# Patient Record
Sex: MALE | Race: WHITE | NOT HISPANIC OR LATINO | Employment: FULL TIME | URBAN - METROPOLITAN AREA
[De-identification: names, ages, dates, MRNs, and addresses within clinical notes are randomized per-mention and may not be internally consistent; named-entity substitution may affect disease eponyms.]

---

## 2018-09-05 ENCOUNTER — APPOINTMENT (OUTPATIENT)
Dept: LAB | Facility: HOSPITAL | Age: 58
End: 2018-09-05

## 2018-09-05 ENCOUNTER — TRANSCRIBE ORDERS (OUTPATIENT)
Dept: ADMINISTRATIVE | Facility: HOSPITAL | Age: 58
End: 2018-09-05

## 2018-09-05 DIAGNOSIS — Z00.8 HEALTH EXAMINATION IN POPULATION SURVEYS: Primary | ICD-10-CM

## 2018-09-05 DIAGNOSIS — Z00.8 HEALTH EXAMINATION IN POPULATION SURVEYS: ICD-10-CM

## 2018-09-05 LAB
CHOLEST SERPL-MCNC: 279 MG/DL (ref 0–200)
EST. AVERAGE GLUCOSE BLD GHB EST-MCNC: 111 MG/DL
HBA1C MFR BLD: 5.5 % (ref 4.2–6.3)
HDLC SERPL-MCNC: 35 MG/DL (ref 40–60)
LDLC SERPL CALC-MCNC: 205 MG/DL (ref 0–100)
NONHDLC SERPL-MCNC: 244 MG/DL
TRIGL SERPL-MCNC: 197 MG/DL (ref 44–166)

## 2018-09-05 PROCEDURE — 80061 LIPID PANEL: CPT

## 2018-09-05 PROCEDURE — 36415 COLL VENOUS BLD VENIPUNCTURE: CPT

## 2018-09-05 PROCEDURE — 83036 HEMOGLOBIN GLYCOSYLATED A1C: CPT

## 2018-10-20 ENCOUNTER — HOSPITAL ENCOUNTER (EMERGENCY)
Facility: HOSPITAL | Age: 58
End: 2018-10-20
Attending: EMERGENCY MEDICINE
Payer: COMMERCIAL

## 2018-10-20 ENCOUNTER — HOSPITAL ENCOUNTER (INPATIENT)
Facility: HOSPITAL | Age: 58
LOS: 3 days | Discharge: HOME | DRG: 520 | End: 2018-10-24
Attending: INTERNAL MEDICINE | Admitting: HOSPITALIST
Payer: COMMERCIAL

## 2018-10-20 ENCOUNTER — HOSPITAL ENCOUNTER (OUTPATIENT)
Dept: MRI IMAGING | Facility: HOSPITAL | Age: 58
Discharge: HOME | End: 2018-10-20
Payer: COMMERCIAL

## 2018-10-20 VITALS
RESPIRATION RATE: 16 BRPM | HEIGHT: 70 IN | BODY MASS INDEX: 24.34 KG/M2 | OXYGEN SATURATION: 98 % | WEIGHT: 170 LBS | TEMPERATURE: 96 F | SYSTOLIC BLOOD PRESSURE: 138 MMHG | HEART RATE: 74 BPM | DIASTOLIC BLOOD PRESSURE: 76 MMHG

## 2018-10-20 DIAGNOSIS — M51.26 LUMBAR HERNIATED DISC: Primary | ICD-10-CM

## 2018-10-20 DIAGNOSIS — M51.26 HERNIATED LUMBAR INTERVERTEBRAL DISC: ICD-10-CM

## 2018-10-20 DIAGNOSIS — M54.16 LUMBAR RADICULOPATHY: Primary | ICD-10-CM

## 2018-10-20 DIAGNOSIS — R29.898 WEAKNESS OF LEFT FOOT: ICD-10-CM

## 2018-10-20 DIAGNOSIS — G95.20 CORD COMPRESSION (HCC): ICD-10-CM

## 2018-10-20 LAB
ALBUMIN SERPL BCP-MCNC: 4.1 G/DL (ref 3.5–5.7)
ALP SERPL-CCNC: 56 U/L (ref 40–150)
ALT SERPL W P-5'-P-CCNC: 14 U/L (ref 7–52)
ANION GAP SERPL CALCULATED.3IONS-SCNC: 9 MMOL/L (ref 4–13)
APTT PPP: 28 SECONDS (ref 24–36)
AST SERPL W P-5'-P-CCNC: 16 U/L (ref 13–39)
BASOPHILS # BLD AUTO: 0.1 THOUSANDS/ΜL (ref 0–0.1)
BASOPHILS NFR BLD AUTO: 1 % (ref 0–1)
BILIRUB SERPL-MCNC: 0.5 MG/DL (ref 0.2–1)
BUN SERPL-MCNC: 16 MG/DL (ref 7–25)
CALCIUM SERPL-MCNC: 8.8 MG/DL (ref 8.6–10.5)
CHLORIDE SERPL-SCNC: 107 MMOL/L (ref 98–107)
CO2 SERPL-SCNC: 24 MMOL/L (ref 21–31)
CREAT SERPL-MCNC: 0.75 MG/DL (ref 0.7–1.3)
EOSINOPHIL # BLD AUTO: 0.2 THOUSAND/ΜL (ref 0–0.61)
EOSINOPHIL NFR BLD AUTO: 3 % (ref 0–6)
ERYTHROCYTE [DISTWIDTH] IN BLOOD BY AUTOMATED COUNT: 13.2 % (ref 11.6–15.1)
GFR SERPL CREATININE-BSD FRML MDRD: 103 ML/MIN/1.73SQ M
GLUCOSE SERPL-MCNC: 100 MG/DL (ref 65–140)
HCT VFR BLD AUTO: 42.2 % (ref 42–52)
HGB BLD-MCNC: 14.5 G/DL (ref 12–17)
INR PPP: 0.98 (ref 0.9–1.5)
LYMPHOCYTES # BLD AUTO: 2.6 THOUSANDS/ΜL (ref 0.6–4.47)
LYMPHOCYTES NFR BLD AUTO: 36 % (ref 14–44)
MCH RBC QN AUTO: 32 PG (ref 26.8–34.3)
MCHC RBC AUTO-ENTMCNC: 34.4 G/DL (ref 31.4–37.4)
MCV RBC AUTO: 93 FL (ref 82–98)
MONOCYTES # BLD AUTO: 0.5 THOUSAND/ΜL (ref 0.17–1.22)
MONOCYTES NFR BLD AUTO: 7 % (ref 4–12)
NEUTROPHILS # BLD AUTO: 3.7 THOUSANDS/ΜL (ref 1.85–7.62)
NEUTS SEG NFR BLD AUTO: 52 % (ref 43–75)
NRBC BLD AUTO-RTO: 0 /100 WBCS
PLATELET # BLD AUTO: 355 THOUSANDS/UL (ref 149–390)
PMV BLD AUTO: 6.4 FL (ref 8.9–12.7)
POTASSIUM SERPL-SCNC: 4 MMOL/L (ref 3.5–5.5)
PROT SERPL-MCNC: 6.9 G/DL (ref 6.4–8.9)
PROTHROMBIN TIME: 11.3 SECONDS (ref 10.1–12.9)
RBC # BLD AUTO: 4.54 MILLION/UL (ref 3.88–5.62)
SODIUM SERPL-SCNC: 140 MMOL/L (ref 134–143)
WBC # BLD AUTO: 7.1 THOUSAND/UL (ref 4.31–10.16)

## 2018-10-20 PROCEDURE — 99285 EMERGENCY DEPT VISIT HI MDM: CPT

## 2018-10-20 PROCEDURE — 85025 COMPLETE CBC W/AUTO DIFF WBC: CPT | Performed by: EMERGENCY MEDICINE

## 2018-10-20 PROCEDURE — 72148 MRI LUMBAR SPINE W/O DYE: CPT

## 2018-10-20 PROCEDURE — 80053 COMPREHEN METABOLIC PANEL: CPT | Performed by: EMERGENCY MEDICINE

## 2018-10-20 PROCEDURE — 85730 THROMBOPLASTIN TIME PARTIAL: CPT | Performed by: EMERGENCY MEDICINE

## 2018-10-20 PROCEDURE — 36415 COLL VENOUS BLD VENIPUNCTURE: CPT | Performed by: EMERGENCY MEDICINE

## 2018-10-20 PROCEDURE — 85610 PROTHROMBIN TIME: CPT | Performed by: EMERGENCY MEDICINE

## 2018-10-20 PROCEDURE — 99220 PR INITIAL OBSERVATION CARE/DAY 70 MINUTES: CPT | Performed by: INTERNAL MEDICINE

## 2018-10-20 RX ORDER — ACETAMINOPHEN 325 MG/1
650 TABLET ORAL EVERY 6 HOURS PRN
Status: DISCONTINUED | OUTPATIENT
Start: 2018-10-20 | End: 2018-10-24 | Stop reason: HOSPADM

## 2018-10-20 RX ORDER — OXYCODONE HYDROCHLORIDE 5 MG/1
10 TABLET ORAL EVERY 4 HOURS PRN
Status: DISCONTINUED | OUTPATIENT
Start: 2018-10-20 | End: 2018-10-24 | Stop reason: HOSPADM

## 2018-10-20 RX ORDER — DEXAMETHASONE SODIUM PHOSPHATE 4 MG/ML
4 INJECTION, SOLUTION INTRA-ARTICULAR; INTRALESIONAL; INTRAMUSCULAR; INTRAVENOUS; SOFT TISSUE EVERY 8 HOURS SCHEDULED
Status: DISCONTINUED | OUTPATIENT
Start: 2018-10-20 | End: 2018-10-23

## 2018-10-20 RX ORDER — OXYCODONE HYDROCHLORIDE 5 MG/1
5 TABLET ORAL EVERY 4 HOURS PRN
Status: DISCONTINUED | OUTPATIENT
Start: 2018-10-20 | End: 2018-10-24 | Stop reason: HOSPADM

## 2018-10-20 RX ADMIN — DEXAMETHASONE SODIUM PHOSPHATE 4 MG: 4 INJECTION, SOLUTION INTRAMUSCULAR; INTRAVENOUS at 22:25

## 2018-10-20 NOTE — ED NOTES
PACS return called for patient information  Pt to be picked up by [facility] to [address]   Call report to [address] [place] [address] Rd, [address]  10/20/18 7887

## 2018-10-20 NOTE — ED NOTES
While on pt rounds pt states he is getting tired of waiting  PACS was recalled and stated they have no placement at this time and are waiting for a bed  Notified dr and pt of the same  HOB elevated  Denies pain or discomfort at this time  No numbness or tingling offered at this time to the lower extremities        Rocky Nolan, JOANA  10/20/18 5831

## 2018-10-20 NOTE — ED NOTES
Report handoff given to [facility] next ED nurse at [facility] till transport company arrives        Yamila Toro RN  10/20/18 5491

## 2018-10-20 NOTE — ED PROVIDER NOTES
History  Chief Complaint   Patient presents with    Leg Swelling     left leg pain and is having increase changes in the ROM  complaints of feeling a burning sensation to the leg       61-year-old male with no past medical history who is presenting with 3 days of weakness in his left foot and 7 days of worsening low back pain  Patient states the pain in his back radiates down his left leg it is described as a burning sensation  He has had similar pain in the past related to sciatica  He noticed the left foot weakness which he describes as dragging associated with difficulty ambulating  Denies any bowel or bladder dysfunction, saddle anesthesia, fever, or other focal deficit  Patient denies any IV drug use  Patient has no known cancer history  History provided by:  Patient      None       History reviewed  No pertinent past medical history  Past Surgical History:   Procedure Laterality Date    HERNIA REPAIR      KNEE SURGERY         History reviewed  No pertinent family history  I have reviewed and agree with the history as documented  Social History   Substance Use Topics    Smoking status: Current Every Day Smoker     Packs/day: 0 25    Smokeless tobacco: Never Used    Alcohol use No        Review of Systems   Constitutional: Negative for chills and fever  HENT: Negative for congestion and rhinorrhea  Respiratory: Negative for shortness of breath  Cardiovascular: Negative for chest pain  Gastrointestinal: Negative for constipation, diarrhea, nausea and vomiting  Genitourinary: Negative for decreased urine volume, difficulty urinating, dysuria, frequency and urgency  Musculoskeletal: Positive for back pain  Negative for neck pain and neck stiffness  Skin: Negative for rash  Neurological: Positive for weakness (As described in HPI) and numbness  Negative for dizziness, syncope and headaches         Physical Exam  Physical Exam   Constitutional: He is oriented to person, place, and time  He appears well-developed and well-nourished  No distress  HENT:   Head: Normocephalic and atraumatic  Eyes: Pupils are equal, round, and reactive to light  EOM are normal    Neck: Normal range of motion  Neck supple  No midline cervical spine tenderness   Cardiovascular: Normal rate, regular rhythm and normal heart sounds  Pulmonary/Chest: Effort normal and breath sounds normal    Abdominal: Soft  Bowel sounds are normal  He exhibits no distension  There is no tenderness  There is no guarding  Musculoskeletal: He exhibits no edema or deformity  Neurological: He is alert and oriented to person, place, and time  No cranial nerve deficit  Patient with negative straight leg raise bilaterally  1/5 strength with extension of the left foot  Strength is otherwise 5/5 in all 4 extremities  Sensory deficit at the left great toe and medial dorsum of left foot  No other sensory deficits appreciated       Skin: Skin is warm and dry  No rash noted  Vital Signs  ED Triage Vitals [10/20/18 0845]   Temperature Pulse Respirations Blood Pressure SpO2   (!) 96 3 °F (35 7 °C) 91 18 132/85 98 %      Temp src Heart Rate Source Patient Position - Orthostatic VS BP Location FiO2 (%)   -- -- -- -- --      Pain Score       No Pain           Vitals:    10/20/18 0845   BP: 132/85   Pulse: 91       Visual Acuity      ED Medications  Medications - No data to display    Diagnostic Studies  Results Reviewed     None                 MRI ED order    (Results Pending)              Procedures  Procedures       Phone Contacts  ED Phone Contact    ED Course                               MDM  Number of Diagnoses or Management Options  Cord compression [facility]):   Lumbar herniated disc:   Diagnosis management comments: 53-year-old male presenting with left foot drop for past 3 days as well as back pain  Vital signs are stable  Patient is nontoxic appearing    Physical exam is consistent with left foot drop  MRI ordered to rule out cord compression    10:25 a m  patient transported to MRI at [facility]     12:45 p m  MRI consistent with left paracentral disc protrusion posteriorly to the L5 vertebral body with displacement of the descending left-sided nerve roots  Patient to be transferred to a facility with neurosurgical intervention  Discussed with Dr Jaycee Carvajal who accepts the transfer  2:30 p m  bed unavailable at this time  Transfer center informed us that that is going to be ready pending discharge of another patient  Relayed this information to the patient  The patient understands  5:06 p m  awaiting transportation and bed assignment  Patient informed me of his intention to leave prior to transfer  I informed them that delay in treatment of this condition could result in permanent disability and dysfunction  Patient agrees to stay at this time  7:00 p m  Patient awaiting transfer,  Randy Mejia made aware of the patient  No acute complaints at this time  CritCare Time    Disposition  Final diagnoses:   None     ED Disposition     None      Follow-up Information    None         Patient's Medications    No medications on file     No discharge procedures on file      ED Provider  Electronically Signed by           Xavier New DO  10/20/18 6178

## 2018-10-20 NOTE — ED NOTES
Returned from MRI with [place] transport provided  Stable  VSS  No resp distress noted  [place]  offered   Pt denies pain or discomfort at present      Amador South RN  10/20/18 4033

## 2018-10-20 NOTE — ED NOTES
Call placed to [facility] for transport tentative pickup is for 1030 AM - MRI notified of the same        Lee Dnenis RN  10/20/18 6773

## 2018-10-20 NOTE — ED NOTES
Report called to/care transferred to [facility]  To [address] oncoming nurse to that floor  Ambulance delayed due to a EMS call   Transport pending at this time     Elizabeth Cabello RN  10/20/18 5590

## 2018-10-20 NOTE — EMTALA/ACUTE CARE TRANSFER
[address]  [facility] EMERGENCY DEPARTMENT  [address] [place] [address] [id number] [phone]  Dept: [phone]      BZFKIR TRANSFER CONSENT    NAME Sarah Beth Erwin                                         1960                              MRN [id number]    I have been informed of my rights regarding examination, treatment, and transfer   by Dr Robby Brewer DO    Benefits: Specialized equipment and/or services available at the receiving facility (Include comment)________________________    Risks: Potential for delay in receiving treatment, Increased discomfort during transfer, Possible worsening of condition or death during transfer      Transfer Request   I acknowledge that my medical condition has been evaluated and explained to me by the emergency department physician or other qualified medical person and/or my attending physician who has recommended and offered to me further medical examination and treatment  I understand the Hospital's obligation with respect to the treatment and stabilization of my emergency medical condition  I nevertheless request to be transferred  I release the Hospital, the doctor, and any other persons caring for me from all responsibility or liability for any injury or ill effects that may result from my transfer and agree to accept all responsibility for the consequences of my choice to transfer, rather than receive stabilizing treatment at the Hospital  I understand that because the transfer is my request, my insurance may not provide reimbursement for the services  The Hospital will assist and direct me and my family in how to make arrangements for transfer, but the hospital is not liable for any fees charged by the transport service  In spite of this understanding, I refuse to consent to further medical examination and treatment which has been offered to me, and request transfer to  Dianna Lees Name, [address] : [address] [facility]   I authorize the performance of emergency medical procedures and treatments upon me in both transit and upon arrival at the receiving facility  Additionally, I authorize the release of any and all medical records to the receiving facility and request they be transported with me, if possible  I authorize the performance of emergency medical procedures and treatments upon me in both transit and upon arrival at the receiving facility  Additionally, I authorize the release of any and all medical records to the receiving facility and request they be transported with me, if possible  I understand that the safest mode of transportation during a medical emergency is an ambulance and that the Hospital advocates the use of this mode of transport  Risks of traveling to the receiving facility by car, including absence of medical control, life sustaining equipment, such as oxygen, and medical personnel has been explained to me and I fully understand them  (KIM CORRECT BOX BELOW)  [  ]  I consent to the stated transfer and to be transported by ambulance/helicopter  [  ]  I consent to the stated transfer, but refuse transportation by ambulance and accept full responsibility for my transportation by car  I understand the risks of non-ambulance transfers and I exonerate the Hospital and its staff from any deterioration in my condition that results from this refusal     X___________________________________________    DATE  10/20/18  TIME________  Signature of patient or legally responsible individual signing on patient behalf           RELATIONSHIP TO PATIENT_________________________          Provider Certification    NAME González Mendoza                                        [facility] 1960                              MRN [id number]    A medical screening exam was performed on the above named patient  Based on the examination:    Condition Necessitating Transfer The primary encounter diagnosis was Lumbar herniated disc  A diagnosis of Cord compression [facility]) was also pertinent to this visit  Patient Condition: The patient has been stabilized such that within reasonable medical probability, no material deterioration of the patient condition or the condition of the unborn child(shayy) is likely to result from the transfer    Reason for Transfer: Level of Care needed not available at this facility    Transfer Requirements: [address]   · Space available and qualified personnel available for treatment as acknowledged by    · Agreed to accept transfer and to provide appropriate medical treatment as acknowledged by       Chris Perez  · Appropriate medical records of the examination and treatment of the patient are provided at the time of transfer   [address] _______  · Transfer will be performed by qualified personnel from    and appropriate transfer equipment as required, including the use of necessary and appropriate life support measures  Provider Certification: I have examined the patient and explained the following risks and benefits of being transferred/refusing transfer to the patient/family:  General risk, such as traffic hazards, adverse weather conditions, rough terrain or turbulence, possible failure of equipment (including vehicle or aircraft), or consequences of actions of persons outside the control of the transport personnel      Based on these reasonable risks and benefits to the patient and/or the unborn child(shayy), and based upon the information available at the time of the patients examination, I certify that the medical benefits reasonably to be expected from the provision of appropriate medical treatments at another medical facility outweigh the increasing risks, if any, to the individuals medical condition, and in the case of labor to the unborn child, from effecting the transfer      X____________________________________________ DATE 10/20/18 TIME_______      ORIGINAL - SEND TO MEDICAL RECORDS   COPY - SEND WITH PATIENT DURING TRANSFER

## 2018-10-20 NOTE — ED NOTES
Pt transported to the MRI at [address] [facility] by [facility]  MRI tech phoned and notified of the pt being in transport        Marva Hernandez RN  10/20/18 1037

## 2018-10-20 NOTE — ED NOTES
PAC called by this rn to request update on the transfer  Per PAC bed is now clean and they will begin to arrange transport shortly and give a call back       Virgie Kolb RN  10/20/18 6630

## 2018-10-21 PROCEDURE — 97163 PT EVAL HIGH COMPLEX 45 MIN: CPT

## 2018-10-21 PROCEDURE — G8979 MOBILITY GOAL STATUS: HCPCS

## 2018-10-21 PROCEDURE — 99225 PR SBSQ OBSERVATION CARE/DAY 25 MINUTES: CPT | Performed by: INTERNAL MEDICINE

## 2018-10-21 PROCEDURE — G8978 MOBILITY CURRENT STATUS: HCPCS

## 2018-10-21 PROCEDURE — 99245 OFF/OP CONSLTJ NEW/EST HI 55: CPT | Performed by: PHYSICIAN ASSISTANT

## 2018-10-21 RX ORDER — POLYETHYLENE GLYCOL 3350 17 G/17G
17 POWDER, FOR SOLUTION ORAL DAILY PRN
Status: DISCONTINUED | OUTPATIENT
Start: 2018-10-21 | End: 2018-10-24 | Stop reason: HOSPADM

## 2018-10-21 RX ADMIN — DEXAMETHASONE SODIUM PHOSPHATE 4 MG: 4 INJECTION, SOLUTION INTRAMUSCULAR; INTRAVENOUS at 21:11

## 2018-10-21 RX ADMIN — DEXAMETHASONE SODIUM PHOSPHATE 4 MG: 4 INJECTION, SOLUTION INTRAMUSCULAR; INTRAVENOUS at 13:56

## 2018-10-21 RX ADMIN — DEXAMETHASONE SODIUM PHOSPHATE 4 MG: 4 INJECTION, SOLUTION INTRAMUSCULAR; INTRAVENOUS at 05:17

## 2018-10-21 NOTE — PLAN OF CARE
Nutrition/Hydration-ADULT     Nutrient/Hydration intake appropriate for improving, restoring or maintaining nutritional needs Not Progressing        Potential for Falls     Patient will remain free of falls Not Progressing

## 2018-10-21 NOTE — PLAN OF CARE
Problem: PHYSICAL THERAPY ADULT  Goal: Performs mobility at highest level of function for planned discharge setting  See evaluation for individualized goals  Treatment/Interventions: Functional transfer training, LE strengthening/ROM, Endurance training, Patient/family training, Bed mobility, Gait training, OT, Spoke to nursing          See flowsheet documentation for full assessment, interventions and recommendations  Prognosis: Good  Problem List: Decreased strength, Decreased endurance, Impaired balance, Decreased safety awareness, Pain  Assessment: Pt is 56 y o  male seen for PT evaluation s/p admit to [facility] on 10/20/2018 w/ Lumbar radiculopathy  PT consulted to assess pt's functional mobility and d/c needs  Order placed for PT eval and tx  Comorbidities affecting pt's physical performance at time of assessment include: L foot drop  PTA, pt was ambulates unrestricted distances and all terrain, has 4 DEANDRE and works full time  Personal factors affecting pt at time of IE include: stairs to enter home, unable to perform dynamic tasks in community, unable to perform physical activity, limited insight into impairments and inability to perform IADLs  Please find objective findings from PT assessment regarding body systems outlined above with impairments and limitations including weakness, impaired balance, decreased endurance, pain, decreased activity tolerance and decreased functional mobility tolerance  Pt demonstrated ability to complete bed mobility and transfers without A  Ambulated with moderately unsteady gait with decreased L foot clearance and frequent scissoring  Educated in importance of pacing  Increased pain and "cramping" with increased activity  Educated in safety during stair management  The following objective measures performed on IE also reveal limitations: Barthel Index: 85/100   Pt's clinical presentation is currently unstable/unpredictable seen in pt's presentation of pain, foot drop  Pt to benefit from continued PT tx to address deficits as defined above and maximize level of functional independent mobility and consistency  From PT/mobility standpoint, recommendation at time of d/c would be OP PT pending progress in order to facilitate return to PLOF  Recommendation: Outpatient PT     PT - OK to Discharge: Yes (when medically stable )    See flowsheet documentation for full assessment

## 2018-10-21 NOTE — UTILIZATION REVIEW
Initial Clinical Review  TRANSFERRED FROM [facility]  Admission: Date/Time/Statement: 10/20/18 @ 2023   Orders Placed This Encounter   Procedures    Place in Observation     Standing Status:   Standing     Number of Occurrences:   1     Order Specific Question:   Admitting Physician     Answer:   Roland Batista [[id number]]     Order Specific Question:   Level of Care     Answer:   Med Surg [16]   History of Illness:   TRANSFERRED FROM [facility]   56 y o  male who presents with 3 days history progressive knee worsening left foot weakness and paresthesias  He reports history of chronic pain and lumbar disc disease  Over the past week, has been doing a lot of physical work and felt he might have aggravated his chronic lumbar disease  He began to notice burning sensation especially affecting his left great toe and left foot medially  He was also unable to flex the left foot  He denies any bowel or urinary dysfunction or saddle anesthesia  At the time of my evaluation, he reports no further back pain but continued left foot weakness and paresthesias  He reports no other focal neurologic deficits  He denies any prior medical history  ED Vital Signs:   ED Triage Vitals   Temperature Pulse Respirations Blood Pressure SpO2   10/20/18 2037 10/20/18 2037 10/20/18 2037 10/20/18 2037 10/20/18 2037   98 3 °F (36 8 °C) 67 18 123/82 99 %      Temp Source Heart Rate Source Patient Position - Orthostatic VS BP Location FiO2 (%)   10/20/18 2037 -- 10/20/18 2037 10/20/18 2037 --   Oral  Lying Right arm       Pain Score       10/20/18 2048       No Pain        Wt Readings from Last 1 Encounters:   10/20/18 69 6 kg (153 lb 7 oz)   Vital Signs (abnormal): Abnormal Labs/Diagnostic Test Results:   MRI LUMBAR SPINE=Motion artifact degrades image quality  Transitional vertebral anatomy seen  The transitional vertebral body appears to represent a lumbarized  S1 when correlated with prior abdominal CT August 15, 2010    No evidence of acute fracture or traumatic subluxation of the lumbar  spine  Multilevel degenerative disc disease and facet arthropathy as detailed  level by level above  There is a focal left paracentral disc protrusion  posterior to the L5 vertebral body  It cannot be determined on these  images whether this originates at the L4-5 or L5-S1 level given the motion  artifact  This does result in displacement of the descending left-sided  nerve roots  The motion artifact particularly limits assessment of the  neural foramen  Neural foraminal narrowing is seen bilaterally at L4-5  and L5-S1  Past Medical/Surgical History: Active Ambulatory Problems     Diagnosis Date Noted    No Active Ambulatory Problems     Resolved Ambulatory Problems     Diagnosis Date Noted    No Resolved Ambulatory Problems     Past Medical History:   Diagnosis Date    Arthritis    Admitting Diagnosis: Back pain, acute [M54 9]  Age/Sex: 56 y o  male  Assessment/Plan:   Lumbar radiculopathy   Assessment & Plan     · Patient presenting with onset left lower extremity paresthesias and weakness more on the left foot/great toe  · Has chronic lumbar disc disease and back pain    MRI of the lumbar spine showed "Multilevel degenerative disc disease and facet arthropathy as detailedlevel by level above   There is a focal left paracentral disc protrusion posterior to the L5 vertebral body   It cannot be determined on these images whether this originates at the L4-5 or L5-S1 level given the motion artifact   This does result in displacement of the descending left-sided nerve roots   The motion artifact particularly limits assessment of the neural foramen   Neural foraminal narrowing is seen bilaterally at L4-5 and L5-S1"  · Currently without back pain at the time of my evaluation  · He will be placed on observation, start on IV steroids, pain control  · Obtain neurosurgery evaluation for further definitive recommendations of care  · PT evaluation   Weakness of left foot   Assessment & Plan     · Secondary to above  · Continue IV steroids, PT evaluation  · Await Neurosurgery input   Admission Orders:  MED SURG  CONSULT NEUROSURGERY  Scheduled Meds:   Current Facility-Administered Medications:  acetaminophen 650 mg Oral Q6H PRN Gagan Earl MD   dexamethasone 4 mg Intravenous [facility] Gagan Earl MD   influenza vaccine 0 5 mL Intramuscular Prior to discharge Janeth Montgomery MD   oxyCODONE 10 mg Oral Q4H PRN Gagan Earl MD   oxyCODONE 5 mg Oral Q4H PRN Gagan Earl MD   Continuous Infusions:    PRN Meds:   acetaminophen    influenza vaccine    oxyCODONE    oxyCODONE

## 2018-10-21 NOTE — ASSESSMENT & PLAN NOTE
· Patient presenting with onset left lower extremity paresthesias and weakness more on the left foot/great toe  · Has chronic lumbar disc disease and back pain  · MRI of the lumbar spine showed "Multilevel degenerative disc disease and facet arthropathy as detailedlevel by level above  There is a focal left paracentral disc protrusion posterior to the L5 vertebral body  It cannot be determined on these images whether this originates at the L4-5 or L5-S1 level given the motion artifact  This does result in displacement of the descending left-sided nerve roots  The motion artifact particularly limits assessment of the neural foramen    Neural foraminal narrowing is seen bilaterally at L4-5 and L5-S1"  · Currently no c/o back pain  · Continue IV decadron  · Pending neurosurgery evaluation  · Pending PT evaluation

## 2018-10-21 NOTE — H&P
H&P- Stacey Mclean 1960, 56 y o  male MRN: [id number]    Unit/Bed#: [facility] 923-01 Encounter: [id number]    Primary Care Provider: Luz Collins MD   Date and time admitted to hospital: 10/20/2018  8:23 PM        * Lumbar radiculopathy   Assessment & Plan    · Patient presenting with onset left lower extremity paresthesias and weakness more on the left foot/great toe  · Has chronic lumbar disc disease and back pain  MRI of the lumbar spine showed "Multilevel degenerative disc disease and facet arthropathy as detailedlevel by level above  There is a focal left paracentral disc protrusion posterior to the L5 vertebral body  It cannot be determined on these images whether this originates at the L4-5 or L5-S1 level given the motion artifact  This does result in displacement of the descending left-sided nerve roots  The motion artifact particularly limits assessment of the neural foramen  Neural foraminal narrowing is seen bilaterally at L4-5 and L5-S1"  · Currently without back pain at the time of my evaluation  · He will be placed on observation, start on IV steroids, pain control  · Obtain neurosurgery evaluation for further definitive recommendations of care  · PT evaluation     Weakness of left foot   Assessment & Plan    · Secondary to above  · Continue IV steroids, PT evaluation  · Await Neurosurgery input         VTE Prophylaxis: Low risk, ambulate       Code Status:  Full code    POLST: POLST form is not discussed and not completed at this time  Anticipated Length of Stay:  Patient will be admitted on an Observation basis with an anticipated length of stay of  < 2 midnights  Justification for Hospital Stay:  Lumbar radiculopathy    Chief Complaint:     Left foot weakness    History of Present Illness:  Stacey Mclean is a 56 y o  male who presents with 3 days history progressive knee worsening left foot weakness and paresthesias  He reports history of chronic pain and lumbar disc disease  Over the past week, has been doing a lot of physical work and felt he might have aggravated his chronic lumbar disease  He began to notice burning sensation especially affecting his left great toe and left foot medially  He was also unable to flex the left foot  He denies any bowel or urinary dysfunction or saddle anesthesia  At the time of my evaluation, he reports no further back pain but continued left foot weakness and paresthesias  He reports no other focal neurologic deficits  He denies any prior medical history    Review of Systems   HENT: Negative  Respiratory: Negative  Gastrointestinal: Negative  Genitourinary: Negative  Musculoskeletal: Negative  Skin: Negative  Neurological: Positive for weakness  Psychiatric/Behavioral: Negative  All other systems reviewed and are negative  Past Medical History:   Diagnosis Date    Arthritis        Past Surgical History:   Procedure Laterality Date    HERNIA REPAIR      KNEE SURGERY         Family History:  non-contributory    Home Meds:  None    Allergies: No Known Allergies      Marital Status: /Civil Union     History   Alcohol Use No     History   Smoking Status    Former Smoker    Packs/day: 0 25   Smokeless Tobacco    Never Used     History   Drug Use    Types: Marijuana           Physical Exam:   Vitals:   Blood Pressure: 123/82 (10/20/18 2037)  Pulse: 67 (10/20/18 2037)  Temperature: 98 3 °F (36 8 °C) (10/20/18 2037)  Temp Source: Oral (10/20/18 2037)  Respirations: 18 (10/20/18 2037)  Height: 5' 10" (177 8 cm) (10/20/18 2037)  Weight - Scale: 69 6 kg (153 lb 7 oz) (10/20/18 2037)  SpO2: 99 % (10/20/18 2037)    Physical Exam   Constitutional: He is oriented to person, place, and time  He appears well-developed and well-nourished  No distress  HENT:   Head: Normocephalic and atraumatic  Eyes: Conjunctivae and EOM are normal  Right eye exhibits no discharge  Left eye exhibits no discharge  No scleral icterus  Neck: Normal range of motion  Neck supple  Cardiovascular: Normal rate, regular rhythm and normal heart sounds  No murmur heard  Pulmonary/Chest: Effort normal and breath sounds normal  No respiratory distress  He has no wheezes  Abdominal: Soft  Bowel sounds are normal  He exhibits no distension and no mass  There is no tenderness  Musculoskeletal: Normal range of motion  He exhibits no edema, tenderness or deformity  Neurological: He is alert and oriented to person, place, and time  No cranial nerve deficit  Decreased sensation noted medial surface left foot, weakness with plantar flexion   Skin: Skin is warm and dry  No rash noted  He is not diaphoretic  No erythema  Psychiatric: He has a normal mood and affect  His behavior is normal    Vitals reviewed  Lab Results: I have personally reviewed pertinent reports  Results from last 7 days  Lab Units 10/20/18  1257   WBC Thousand/uL 7 10   HEMOGLOBIN g/dL 14 5   HEMATOCRIT % 42 2   PLATELETS Thousands/uL 355   NEUTROS PCT % 52   LYMPHS PCT % 36   MONOS PCT % 7   EOS PCT % 3       Results from last 7 days  Lab Units 10/20/18  1257   SODIUM mmol/L 140   POTASSIUM mmol/L 4 0   CHLORIDE mmol/L 107   CO2 mmol/L 24   BUN mg/dL 16   CREATININE mg/dL 0 75   CALCIUM mg/dL 8 8   ALK PHOS U/L 56   ALT U/L 14   AST U/L 16       Results from last 7 days  Lab Units 10/20/18  1257   INR  0 98       Imaging: I have personally reviewed pertinent reports  Mri Lumbar Spine Wo Contrast    Result Date: 10/20/2018  Narrative: INDICATION:  Low back pain into LLE, unable to extend left great toe  ORDERING PROVIDER:  Shahab Salazar  TECHNIQUE:  Unenhanced multiplanar, multisequence MR imaging of the lumbar spine  COMPARISON:  Correlation made with abdominal CT August 15, 2010  FINDINGS:  Motion artifact degrades image quality  Transitional lumbar vertebral anatomy is seen    When correlating with prior abdominal CT August 15, 2010 the transitional vertebral body appears to represent a lumbarized S1  Normal lumbar alignment is demonstrated  Vertebral heights are well maintained  Bone marrow signal is within normal limits, and no suspicious osseous lesion is identified  Conus medullaris is unremarkable  Paraspinal soft tissues and visualized portions of the abdomen and pelvis are unremarkable  Diffuse disc desiccation is seen  Mild disc space narrowing seen throughout the lumbar spine  At L1-2 there is mild diffuse annular bulging  No significant central canal narrowing is seen  The neuroforamen are suboptimally assessed given the motion artifact although no high-grade stenosis is seen  At L2-3 there is diffuse disc bulging  There is mild facet arthropathy  There is mild narrowing the ventral aspect of the central canal   The neuroforamen are suboptimally assessed given the motion artifact  At L3-4 there is concentric disc bulge  There is mild facet arthropathy seen  There is minor narrowing the ventral aspect of the central canal  Mild neural foraminal narrowing suspected but suboptimally assessed given motion artifact  At L4-5 there is concentric disc bulge which is slightly eccentric into the left foraminal region  Bilateral facet arthropathy seen, greater on the left  There is a focal left paracentral disc protrusion posterior to the L5 vertebral body  A cannot be determined whether this originates at the L4-5 or L5-S1 level  This does displace the descending left-sided nerve roots  Neural foraminal narrowing seen bilaterally at L4-5, greater on the left  There is at least mild central canal narrowing at L4-5  At L5-S1 there is diffuse disc bulging  Left paracentral disc extrusion seen posterior to the L5 vertebral body  It cannot be determined whether this originates at L4-5 or L5-S1 level given the motion artifact  This does displace the descending left-sided nerve roots    Bilateral neural foraminal narrowing seen at L5-S1, greater on the left        Impression: Motion artifact degrades image quality  Transitional vertebral anatomy seen  The transitional vertebral body appears to represent a lumbarized S1 when correlated with prior abdominal CT August 15, 2010  No evidence of acute fracture or traumatic subluxation of the lumbar spine  Multilevel degenerative disc disease and facet arthropathy as detailed level by level above  There is a focal left paracentral disc protrusion posterior to the L5 vertebral body  It cannot be determined on these images whether this originates at the L4-5 or L5-S1 level given the motion artifact  This does result in displacement of the descending left-sided nerve roots  The motion artifact particularly limits assessment of the neural foramen  Neural foraminal narrowing is seen bilaterally at L4-5 and L5-S1  Signed by Eduardo Augustine MD      ** Please Note: Dragon 360 Dictation voice to text software may have been used in the creation of this document   **

## 2018-10-21 NOTE — CONSULTS
Consultation - Neurosurgery   Polo Jane 56 y o  male MRN: [id number]  Unit/Bed#: [facility] 923-01 Encounter: [id number]      Assessment/Plan     Assessment:  1  Left foot drop  2  Acute on chronic low back pain  3  Multilevel degenerative disc disease and facet arthropathy    Plan:  - Will give trial of epidural steroid injection tomorrow  -okay for PT and OT mobilization out of bed  - if no improvement may need disc surgery later this week    History of Present Illness   Consults    HPI: Polo Jane is a 56y o  year old male with a history of chronic low back pain  He reports back pain over the last few weeks  After doing physical work  About 4 days ago he woke up his back pain was gone and he noticed he was unable to raise his left toes  His foot is dragging he is finding it difficult to ambulate and he has occasional numbness and is her left great toe  Denies any bowel or bladder symptoms  He is ambulating independently in the room  Consults    Review of Systems   Neurological: Positive for weakness (left foot and toes) and numbness  All other systems reviewed and are negative  Historical Information   Past Medical History:   Diagnosis Date    Arthritis      Past Surgical History:   Procedure Laterality Date    HERNIA REPAIR      KNEE SURGERY       History   Alcohol Use No     History   Drug Use    Types: Marijuana     History   Smoking Status    Former Smoker    Packs/day: 0 25   Smokeless Tobacco    Never Used     History reviewed  No pertinent family history  Meds/Allergies   all current active meds have been reviewed  No Known Allergies    Objective     Intake/Output Summary (Last 24 hours) at 10/21/18 1228  Last data filed at 10/21/18 0900   Gross per 24 hour   Intake              710 ml   Output                0 ml   Net              710 ml       Physical Exam   Constitutional: He is oriented to person, place, and time  He appears well-developed and well-nourished     HENT: Head: Normocephalic and atraumatic  Eyes: EOM are normal    Neck: Neck supple  Cardiovascular: Normal rate and regular rhythm  Pulmonary/Chest: Effort normal    Abdominal: Soft  Neurological: He is alert and oriented to person, place, and time  He has normal strength  He has an abnormal Heel to [employer] (Abnormal with  the left side)  Skin: Skin is warm and dry  Psychiatric: He has a normal mood and affect  His speech is normal      Neurologic Exam     Mental Status   Oriented to person, place, and time  Attention: normal  Concentration: normal    Speech: speech is normal   Level of consciousness: alert  Knowledge: good  Cranial Nerves   Cranial nerves II through XII intact  CN III, IV, VI   Extraocular motions are normal      Motor Exam   Muscle bulk: normal  Overall muscle tone: normal  Right arm tone: normal  Left arm tone: normal  Right leg tone: normal  Left leg tone: normal    Strength   Strength 5/5 throughout  Right anterior tibial: 2/5Unable to raise his left toes    Proximal strength intact       Sensory Exam   Left leg light touch: decreased from toes    Gait, Coordination, and Reflexes     Coordination   Heel to shin coordination: abnormal (Abnormal with  the left side)       Vitals:Blood pressure 122/78, pulse 84, temperature 97 7 °F (36 5 °C), temperature source Oral, resp  rate 19, height 5' 10" (1 778 m), weight 69 6 kg (153 lb 7 oz), SpO2 98 %  ,Body mass index is 22 02 kg/m²  Lab Results: I have personally reviewed pertinent results        Imaging Studies: I have personally reviewed pertinent films in PACS      VTE Prophylaxis: Sequential compression device (Venodyne)

## 2018-10-21 NOTE — PROGRESS NOTES
Progress Note - Iam Hope 1960, 56 y o  male MRN: [id number]    Unit/Bed#: [facility] 923-01 Encounter: [id number]    Primary Care Provider: Joan Ramirez MD   Date and time admitted to hospital: 10/20/2018  8:23 PM        * Lumbar radiculopathy   Assessment & Plan    · Patient presenting with onset left lower extremity paresthesias and weakness more on the left foot/great toe  · Has chronic lumbar disc disease and back pain  · MRI of the lumbar spine showed "Multilevel degenerative disc disease and facet arthropathy as detailedlevel by level above  There is a focal left paracentral disc protrusion posterior to the L5 vertebral body  It cannot be determined on these images whether this originates at the L4-5 or L5-S1 level given the motion artifact  This does result in displacement of the descending left-sided nerve roots  The motion artifact particularly limits assessment of the neural foramen  Neural foraminal narrowing is seen bilaterally at L4-5 and L5-S1"  · Currently no c/o back pain  · Continue IV decadron  · Pending neurosurgery evaluation  · Pending PT evaluation      Weakness of left foot   Assessment & Plan    · Secondary to above  · Continue IV steroids, PT evaluation  · Await Neurosurgery input       VTE Pharmacologic Prophylaxis:   Pharmacologic: low risk   Mechanical VTE Prophylaxis in Place: Yes    Patient Centered Rounds: I have performed bedside rounds with nursing staff today  Discussions with Specialists or Other Care Team Provider: appreciate neurosurgery input  Education and Discussions with Family / Patient: patient  Time Spent for Care: 30 minutes  More than 50% of total time spent on counseling and coordination of care as described above  Current Length of Stay: 1 day(s)    Current Patient Status: Observation   Certification Statement: leave OBS per UR  Awaiting neurosurgery input     Discharge Plan: pending neurosurgery plan  Also awaiting PT evaluation      Code Status: Level 1 - Full Code      Subjective:   Patient reports doing okay  No back pain but still with L great toe weakness/paresthesias  He denies bowel/bladder incontinence  Objective:     Vitals:   Temp (24hrs), Av 2 °F (36 2 °C), Min:96 °F (35 6 °C), Max:98 3 °F (36 8 °C)    Temp:  [96 °F (35 6 °C)-98 3 °F (36 8 °C)] 97 7 °F (36 5 °C)  HR:  [63-88] 84  Resp:  [16-19] 19  BP: (106-138)/(62-82) 122/78  SpO2:  [97 %-99 %] 98 %  Body mass index is 22 02 kg/m²  Input and Output Summary (last 24 hours): Intake/Output Summary (Last 24 hours) at 10/21/18 1025  Last data filed at 10/21/18 0900   Gross per 24 hour   Intake              710 ml   Output                0 ml   Net              710 ml       Physical Exam:     Physical Exam   Constitutional: He is oriented to person, place, and time  No distress  Cardiovascular: Normal rate and regular rhythm  Pulmonary/Chest: Effort normal and breath sounds normal  No respiratory distress  He has no wheezes  Abdominal: Soft  Bowel sounds are normal  He exhibits no distension  There is no tenderness  Musculoskeletal: He exhibits no edema or tenderness  Neurological: He is alert and oriented to person, place, and time  Dec strength L great toe flexion/extension  Skin: Skin is warm and dry  He is not diaphoretic  Psychiatric: He has a normal mood and affect  Nursing note and vitals reviewed        Additional Data:     Labs:      Results from last 7 days  Lab Units 10/20/18  1257   WBC Thousand/uL 7 10   HEMOGLOBIN g/dL 14 5   HEMATOCRIT % 42 2   PLATELETS Thousands/uL 355   NEUTROS PCT % 52   LYMPHS PCT % 36   MONOS PCT % 7   EOS PCT % 3       Results from last 7 days  Lab Units 10/20/18  1257   SODIUM mmol/L 140   POTASSIUM mmol/L 4 0   CHLORIDE mmol/L 107   CO2 mmol/L 24   BUN mg/dL 16   CREATININE mg/dL 0 75   CALCIUM mg/dL 8 8   ALK PHOS U/L 56   ALT U/L 14   AST U/L 16       Results from last 7 days  Lab Units 10/20/18  1257   INR  0 98 * I Have Reviewed All Lab Data Listed Above  * Additional Pertinent Lab Tests Reviewed: All Labs Within Last 24 Hours Reviewed    Imaging:    Imaging Reports Reviewed Today Include: all  Imaging Personally Reviewed by Myself Includes:  none    Recent Cultures (last 7 days):           Last 24 Hours Medication List:     Current Facility-Administered Medications:  acetaminophen 650 mg Oral Q6H PRN Shannan Alarcon MD   dexamethasone 4 mg Intravenous Q8H Sandor Frazier MD   influenza vaccine 0 5 mL Intramuscular Prior to discharge Willem Tanner MD   oxyCODONE 10 mg Oral Q4H PRN Shannan Alarcon MD   oxyCODONE 5 mg Oral Q4H PRN Shannan Alarcon MD        Today, Patient Was Seen By: Lucina Soria PA-C    ** Please Note: Dictation voice to text software may have been used in the creation of this document   **

## 2018-10-21 NOTE — PHYSICAL THERAPY NOTE
Physical Therapy Evaluation     Patient's Name: Peter More    Admitting Diagnosis  Back pain, acute [M54 9]    Problem List  Patient Active Problem List   Diagnosis    Lumbar radiculopathy    Weakness of left foot       Past Medical History  Past Medical History:   Diagnosis Date    Arthritis        Past Surgical History  Past Surgical History:   Procedure Laterality Date    HERNIA REPAIR      KNEE SURGERY        10/21/18 1100   Note Type   Note type Eval/Treat   Pain Assessment   Pain Assessment 0-10   Pain Score No Pain   Home Living   Type of [address] One level   Additional Comments Pt reports he was helping his mother who has cancer and lives with him   Prior Function   Level of [place] Independent with ADLs and functional mobility   Lives With Spouse  (mother)   Receives Help From Family   ADL Assistance Independent   IADLs Independent   Vocational Full time employment  (construction)   Restrictions/Precautions   [place] Bearing Precautions Per Order No   Other Precautions Pain; Fall Risk;Multiple lines; Impulsive   General   Family/Caregiver Present No   Cognition   Orientation Level Oriented X4   RLE Assessment   RLE Assessment WNL   LLE Assessment   LLE Assessment (grossly 5/5 except ankle DF 2/5)   Coordination   Movements are Fluid and Coordinated 0   Coordination and Movement Description mild ataxia, scissoring   Bed Mobility   Supine to Sit 7  Independent   Sit to Supine 7  Independent   Transfers   Sit to Stand 5  Supervision   Stand to Sit 5  Supervision   Ambulation/Elevation   Gait pattern Excessively slow;Decreased foot clearance;Shuffling;Narrow PEG;Scissoring   Gait Assistance 4  Minimal assist   Additional items Assist x 1   Assistive Device None   Distance 120+120   Stair Management Assistance 4  Minimal assist   Additional items Assist x 1   Stair Management Technique One rail R;Nonreciprocal   Number of Stairs 5  (training steps)   Balance   Static Sitting Good Dynamic Sitting Fair +   Static Standing Fair -   Dynamic Standing Fair -   Ambulatory Poor +   Endurance Deficit   Endurance Deficit Yes   Endurance Deficit Description limited by anterior tib pain progressively worsening with movement   Activity Tolerance   Activity Tolerance Patient limited by pain   Medical Staff Made Aware yes, Nevin Lyles from [facility] gave clearance to work with pt   Nurse Made Aware yes, nsg gave clearance to work with pt   Assessment   Prognosis Good   Problem List Decreased strength;Decreased endurance; Impaired balance;Decreased safety awareness;Pain   Assessment Pt is 56 y o  male seen for PT evaluation s/p admit to [facility] on 10/20/2018 w/ Lumbar radiculopathy  PT consulted to assess pt's functional mobility and d/c needs  Order placed for PT eval and tx  Comorbidities affecting pt's physical performance at time of assessment include: L foot drop  PTA, pt was ambulates unrestricted distances and all terrain, has 4 DEANDRE and works full time  Personal factors affecting pt at time of IE include: stairs to enter home, unable to perform dynamic tasks in community, unable to perform physical activity, limited insight into impairments and inability to perform IADLs  Please find objective findings from PT assessment regarding body systems outlined above with impairments and limitations including weakness, impaired balance, decreased endurance, pain, decreased activity tolerance and decreased functional mobility tolerance  Pt demonstrated ability to complete bed mobility and transfers without A  Ambulated with moderately unsteady gait with decreased L foot clearance and frequent scissoring  Educated in importance of pacing  Increased pain and "cramping" with increased activity  Educated in safety during stair management  The following objective measures performed on IE also reveal limitations: Barthel Index: 85/100   Pt's clinical presentation is currently unstable/unpredictable seen in pt's presentation of pain, foot drop  Pt to benefit from continued PT tx to address deficits as defined above and maximize level of functional independent mobility and consistency  From PT/mobility standpoint, recommendation at time of d/c would be OP PT pending progress in order to facilitate return to PLOF  Goals   Patient Goals To get my strength back   STG Expiration Date 11/02/18   Short Term Goal #1 1  Complete bed mobility and transfers I to decrease need for caregiver in home  2  Ambulate 300' I to complete household and community mobility without A  3  Improve dynamic balance to good to decrease need for UE support during ambulation  4  Be educated & demonstate 12 steps to be able to enter home without A  Plan   Treatment/Interventions Functional transfer training;LE strengthening/ROM; Endurance training;Patient/family training;Bed mobility;Gait training;OT;Spoke to nursing   PT Frequency (3-5x/wk)   Recommendation   Recommendation Outpatient PT   PT - OK to Discharge Yes  (when medically stable )   Barthel Index   Feeding 10   Bathing 0   Grooming Score 5   Dressing Score 10   Bladder Score 10   Bowels Score 10   Toilet Use Score 10   Transfers (Bed/Chair) Score 15   Mobility (Level Surface) Score 10   Stairs Score 5   Barthel Index Score 85           Nagi Orozco, PT

## 2018-10-21 NOTE — ASSESSMENT & PLAN NOTE
· Patient presenting with onset left lower extremity paresthesias and weakness more on the left foot/great toe  · Has chronic lumbar disc disease and back pain  MRI of the lumbar spine showed "Multilevel degenerative disc disease and facet arthropathy as detailedlevel by level above  There is a focal left paracentral disc protrusion posterior to the L5 vertebral body  It cannot be determined on these images whether this originates at the L4-5 or L5-S1 level given the motion artifact  This does result in displacement of the descending left-sided nerve roots  The motion artifact particularly limits assessment of the neural foramen    Neural foraminal narrowing is seen bilaterally at L4-5 and L5-S1"  · Currently without back pain at the time of my evaluation  · He will be placed on observation, start on IV steroids, pain control  · Obtain neurosurgery evaluation for further definitive recommendations of care  · PT evaluation

## 2018-10-22 ENCOUNTER — ANESTHESIA EVENT (INPATIENT)
Dept: PERIOP | Facility: HOSPITAL | Age: 58
DRG: 520 | End: 2018-10-22
Payer: COMMERCIAL

## 2018-10-22 PROBLEM — M51.26 HERNIATED LUMBAR INTERVERTEBRAL DISC: Status: ACTIVE | Noted: 2018-10-20

## 2018-10-22 LAB
ABO GROUP BLD: NORMAL
BILIRUB UR QL STRIP: NEGATIVE
BLD GP AB SCN SERPL QL: NEGATIVE
CLARITY UR: NORMAL
COLOR UR: YELLOW
GLUCOSE UR STRIP-MCNC: NEGATIVE MG/DL
HCV AB SER QL: NORMAL
HGB UR QL STRIP.AUTO: NEGATIVE
KETONES UR STRIP-MCNC: NEGATIVE MG/DL
LEUKOCYTE ESTERASE UR QL STRIP: NEGATIVE
NITRITE UR QL STRIP: NEGATIVE
PH UR STRIP.AUTO: 6.5 [PH] (ref 4.5–8)
PROT UR STRIP-MCNC: NEGATIVE MG/DL
RH BLD: POSITIVE
SP GR UR STRIP.AUTO: 1.02 (ref 1–1.03)
SPECIMEN EXPIRATION DATE: NORMAL
UROBILINOGEN UR QL STRIP.AUTO: 1 E.U./DL

## 2018-10-22 PROCEDURE — 93005 ELECTROCARDIOGRAM TRACING: CPT

## 2018-10-22 PROCEDURE — 86901 BLOOD TYPING SEROLOGIC RH(D): CPT | Performed by: PHYSICIAN ASSISTANT

## 2018-10-22 PROCEDURE — 99232 SBSQ HOSP IP/OBS MODERATE 35: CPT | Performed by: NEUROLOGICAL SURGERY

## 2018-10-22 PROCEDURE — 86803 HEPATITIS C AB TEST: CPT | Performed by: INTERNAL MEDICINE

## 2018-10-22 PROCEDURE — 81003 URINALYSIS AUTO W/O SCOPE: CPT | Performed by: PHYSICIAN ASSISTANT

## 2018-10-22 PROCEDURE — 86850 RBC ANTIBODY SCREEN: CPT | Performed by: PHYSICIAN ASSISTANT

## 2018-10-22 PROCEDURE — 99232 SBSQ HOSP IP/OBS MODERATE 35: CPT | Performed by: INTERNAL MEDICINE

## 2018-10-22 PROCEDURE — 86900 BLOOD TYPING SEROLOGIC ABO: CPT | Performed by: PHYSICIAN ASSISTANT

## 2018-10-22 RX ADMIN — DEXAMETHASONE SODIUM PHOSPHATE 4 MG: 4 INJECTION, SOLUTION INTRAMUSCULAR; INTRAVENOUS at 05:47

## 2018-10-22 RX ADMIN — DEXAMETHASONE SODIUM PHOSPHATE 4 MG: 4 INJECTION, SOLUTION INTRAMUSCULAR; INTRAVENOUS at 23:09

## 2018-10-22 RX ADMIN — DEXAMETHASONE SODIUM PHOSPHATE 4 MG: 4 INJECTION, SOLUTION INTRAMUSCULAR; INTRAVENOUS at 13:14

## 2018-10-22 NOTE — PHYSICIAN ADVISOR
Current patient class: Observation  The patient is currently on Hospital Day: 3 at [address] [place] [address]        After review of the relevant documentation, labs, vital signs and test results, the patient is appropriate for INPATIENT ADMISSION  Rationale is as follows: The patient is a 62-year-old male who was hospitalized beginning October 20, 2018 under observation status after presenting with left lower extremity paresthesias and weakness in the distal extremity  The MRI is as follows:    "Impression: Motion artifact degrades image quality  Transitional vertebral anatomy seen  The transitional vertebral body appears to represent a lumbarized S1 when correlated with prior abdominal CT August 15, 2010  No evidence of acute fracture or traumatic subluxation of the lumbar spine  Multilevel degenerative disc disease and facet arthropathy as detailed level by level above  There is a focal left paracentral disc protrusion posterior to the L5 vertebral body  It cannot be determined on these images whether this originates at the L4-5 or L5-S1 level given the motion artifact  This does result in displacement of the descending left-sided nerve roots  The motion artifact particularly limits assessment of the neural foramen  Neural foraminal narrowing is seen bilaterally at L4-5 and L5-S1 "    The patient was seen by Neurosurgery on October 21st   The diagnosis was acute on chronic low back pain with left foot drop and multilevel degenerative disc disease and facet arthropathy and radiculopathy  The patient has remained on dexamethasone 4 mg intravenous every 8 hours since presentation  This medication will continue  Progress notes from today October 22nd indicate that the patient will likely undergo surgery tomorrow  "Patient is not medically clear to leave, needs neurosurgery tomorrow"    The patient did not respond significantly during the observation period to allow for discharge home   At this time I recommend change in status to inpatient level care      The patients vitals on arrival were ED Triage Vitals   Temperature Pulse Respirations Blood Pressure SpO2   10/20/18 2037 10/20/18 2037 10/20/18 2037 10/20/18 2037 10/20/18 2037   98 3 °F (36 8 °C) 67 18 123/82 99 %      Temp Source Heart Rate Source Patient Position - Orthostatic VS BP Location FiO2 (%)   10/20/18 2037 10/22/18 0009 10/20/18 2037 10/20/18 2037 --   Oral Monitor Lying Right arm       Pain Score       10/20/18 2048       No Pain           Past Medical History:   Diagnosis Date    Arthritis      Past Surgical History:   Procedure Laterality Date    HERNIA REPAIR      KNEE SURGERY         The patient was admitted to the hospital at 2023 on 10/20/18 for the following diagnosis:  Back pain, acute [M54 9]    Consults have been placed to:   IP CONSULT TO NEUROSURGERY    Vitals:    10/21/18 1500 10/22/18 0009 10/22/18 0809 10/22/18 0904   BP: 138/73 136/59  126/91   BP Location: Right arm Right arm  Right arm   Pulse: 90 94 71    Resp: 18 18 18    Temp: 98 7 °F (37 1 °C) 98 2 °F (36 8 °C) (!) 97 4 °F (36 3 °C)    TempSrc: Oral Oral Oral    SpO2: 97% 98%     Weight:       Height:           Most recent labs:    Recent Labs      10/20/18   1257   WBC  7 10   HGB  14 5   HCT  42 2   PLT  355   K  4 0   NA  140   CALCIUM  8 8   BUN  16   CREATININE  0 75   INR  0 98   AST  16   ALT  14   ALKPHOS  56       Surgical procedures (if appropriate):  Procedure(s):  MIS/MetRx left L5-S1 hemiLAMINECTOMY, medial facetectmoy, & microdiscectomy

## 2018-10-22 NOTE — ANESTHESIA PREPROCEDURE EVALUATION
Review of Systems/Medical History  Patient summary reviewed  Chart reviewed  No history of anesthetic complications     Cardiovascular  EKG reviewed, Negative cardio ROS   Comment: EKG-SR,  Pulmonary  Smoker (Prev 1ppd, Quit 12/2017) ex-smoker  ,        GI/Hepatic      Comment: Prev hx ETOH abuse--quit 10 years ago     Negative  ROS        Endo/Other  Negative endo/other ROS      GYN  Negative gynecology ROS          Hematology      Comment: H/H 14/43 Musculoskeletal    Comment: Lumbar radiculopathy/Lumbar HNP--left foot weakness/paresthesia      Neurology  Negative neurology ROS      Psychology   Negative psychology ROS              Physical Exam    Airway    Mallampati score: II  TM Distance: >3 FB       Dental   No notable dental hx     Cardiovascular  Comment: Negative ROS, Rhythm: regular,     Pulmonary  Breath sounds clear to auscultation,     Other Findings        Anesthesia Plan  ASA Score- 2     Anesthesia Type- general with ASA Monitors  Additional Monitors:   Airway Plan: ETT  Comment: T&S  Plan Factors-    Induction- intravenous  Postoperative Plan- Plan for postoperative opioid use  Planned trial extubation    Informed Consent- Anesthetic plan and risks discussed with patient  I personally reviewed this patient with the CRNA  Discussed and agreed on the Anesthesia Plan with the CRNA Gerhardt Sep

## 2018-10-22 NOTE — PROGRESS NOTES
Progress Note - Neurosurgery   Ethel Willoughby 56 y o  male MRN: [id number]  [facility] Encounter: [id number]    Assessment:  1  Left foot drop  2  Acute on chronic back pain  3  Multilevel degenerative disc disease and facet arthropathy    Plan:  · Exam:  Alert and oriented x 3, MARTINEZ with significant weakness in left foot with dorsiflexion, reflexes 2+ and symmetrical, no vivar or clonus noted  · Imaging reviewed personally and by attending  Results are as follows:  · MRI lumbar spine wo contrast (10/20/2018): Multilevel degenerative disc disease and facet arthropathy  Focal left paracentral disc protrusion posterior to the L5 vertebral body, resulting in displacement of left sided nerve root  Motion artifact present  · Medical management per primary team  · Mobilize as tolerated with assistance  · DVT PPX:  SCDs (not worn during exam)  · Hold pharm ppx, surgical intervention scheduled for tomorrow  · Neurosurgery will continue to follow  Patient is scheduled for an L5-S1 hemilaminectomy and microdiscectomy with Dr Drew Earing tomorrow  He will be NPO after midnight  Pre-op orders placed and nose to toes protocol ordered  Please call with questions or concerns  Subjective/Objective   Chief Complaint: "I have no pain " / follow up left foot drop    Subjective: Patient denies pain at this time  He states his left foot continues to be weak with dorsiflexion  He has numbness on the dorsal aspect of the left foot  No new numbness / tingling, weakness, or back pain noted  He denies bowel / bladder issues, N/V, CP, SOB, headaches  Objective: Laying in bed, NAD  I/O       10/20 0701 - 10/21 0700 10/21 0701 - 10/22 0700 10/22 0701 - 10/23 0700    P  O  350 1800 400    Total Intake(mL/kg) 350 (5) 1800 (25 9) 400 (5 7)    Net +350 +1800 +400           Unmeasured Urine Occurrence 2 x 3 x 1 x          Invasive Devices     Peripheral Intravenous Line            Peripheral IV 10/21/18 Left Forearm 1 day              Physical Exam:  Vitals: Blood pressure 126/91, pulse 71, temperature (!) 97 4 °F (36 3 °C), temperature source Oral, resp  rate 18, height 5' 10" (1 778 m), weight 69 6 kg (153 lb 7 oz), SpO2 98 %  ,Body mass index is 22 02 kg/m²  General appearance: alert, appears stated age, cooperative and no distress  Head: Normocephalic, without obvious abnormality, atraumatic  Eyes: conjugate gaze  Lungs: non labored breathing  Heart: regular heart rate  Back:  No TTP  Neurologic:   Mental status: Alert, oriented x 3, thought content appropriate  Cranial nerves: grossly intact (Cranial nerves II-XII)  Sensory: Decreased LT in left foot, diminished PP on dorsal aspect of left foot, DST intact, JPS 3/3  Motor: moving all extremities with focal weakness in the left foot, strength 5/5 except as noted:  1/5 DF LLE  Reflexes: 2+ and symmetric, no clonus or vivar noted  Coordination:  No drift, finger to nose normal bilaterally    Lab Results:    Results from last 7 days  Lab Units 10/20/18  1257   WBC Thousand/uL 7 10   HEMOGLOBIN g/dL 14 5   HEMATOCRIT % 42 2   PLATELETS Thousands/uL 355   NEUTROS PCT % 52   MONOS PCT % 7       Results from last 7 days  Lab Units 10/20/18  1257   SODIUM mmol/L 140   POTASSIUM mmol/L 4 0   CHLORIDE mmol/L 107   CO2 mmol/L 24   BUN mg/dL 16   CREATININE mg/dL 0 75   CALCIUM mg/dL 8 8   ALK PHOS U/L 56   ALT U/L 14   AST U/L 16       Results from last 7 days  Lab Units 10/20/18  1257   INR  0 98   PTT seconds 28     No results found for: TROPONINT  ABG:No results found for: PHART, CLO4FHT, PO2ART, BWY6OTS, M4VQGUKP, BEART, SOURCE    Imaging Studies: I have personally reviewed pertinent reports  and I have personally reviewed pertinent films in PACS    Mri Lumbar Spine Wo Contrast    Result Date: 10/20/2018  Impression: Motion artifact degrades image quality  Transitional vertebral anatomy seen    The transitional vertebral body appears to represent a lumbarized S1 when correlated with prior abdominal CT August 15, 2010  No evidence of acute fracture or traumatic subluxation of the lumbar spine  Multilevel degenerative disc disease and facet arthropathy as detailed level by level above  There is a focal left paracentral disc protrusion posterior to the L5 vertebral body  It cannot be determined on these images whether this originates at the L4-5 or L5-S1 level given the motion artifact  This does result in displacement of the descending left-sided nerve roots  The motion artifact particularly limits assessment of the neural foramen  Neural foraminal narrowing is seen bilaterally at L4-5 and L5-S1  Signed by Lucille Ford MD    EKG, Pathology, and Other Studies: I have personally reviewed pertinent reports        VTE Pharmacologic Prophylaxis: Reason for no pharmacologic prophylaxis - surgical intervention scheduled for tomorrow    VTE Mechanical Prophylaxis: sequential compression device

## 2018-10-22 NOTE — ASSESSMENT & PLAN NOTE
· Patient presenting with onset left lower extremity paresthesias and weakness more on the left foot/great toe  · Has chronic lumbar disc disease and back pain  · MRI of the lumbar spine showed "Multilevel degenerative disc disease and facet arthropathy as detailedlevel by level above  There is a focal left paracentral disc protrusion posterior to the L5 vertebral body  It cannot be determined on these images whether this originates at the L4-5 or L5-S1 level given the motion artifact  This does result in displacement of the descending left-sided nerve roots  The motion artifact particularly limits assessment of the neural foramen    Neural foraminal narrowing is seen bilaterally at L4-5 and L5-S1"  · Currently no c/o back pain  · Continue IV decadron  · Neurosurgery had originally recommended trial of CINDY yesterday however today Dr Jeffry Moran recommending surgery tomorrow   · PT rec OP PT

## 2018-10-22 NOTE — PROGRESS NOTES
Progress Note - Jose Louis 1960, 56 y o  male MRN: [id number]    Unit/Bed#: [facility] 923-01 Encounter: [id number]    Primary Care Provider: Brandy Balderrama MD   Date and time admitted to hospital: 10/20/2018  8:23 PM    * Lumbar radiculopathy   Assessment & Plan    · Patient presenting with onset left lower extremity paresthesias and weakness more on the left foot/great toe  · Has chronic lumbar disc disease and back pain  · MRI of the lumbar spine showed "Multilevel degenerative disc disease and facet arthropathy as detailedlevel by level above  There is a focal left paracentral disc protrusion posterior to the L5 vertebral body  It cannot be determined on these images whether this originates at the L4-5 or L5-S1 level given the motion artifact  This does result in displacement of the descending left-sided nerve roots  The motion artifact particularly limits assessment of the neural foramen  Neural foraminal narrowing is seen bilaterally at L4-5 and L5-S1"  · Currently no c/o back pain  · Continue IV decadron  · Neurosurgery had originally recommended trial of CINDY yesterday however today Dr Roni Harley recommending surgery tomorrow   · PT rec OP PT       Weakness of left foot   Assessment & Plan    · Secondary to above  · Continue IV steroids, PT evaluation  · For surgery tomorrow       VTE Pharmacologic Prophylaxis:   Pharmacologic: not on a/c likely pending anticoagulation   Mechanical VTE Prophylaxis in Place: Yes    Patient Centered Rounds: I have performed bedside rounds with nursing staff today  Discussions with Specialists or Other Care Team Provider: Dr Juan Manuel Pressley, neurosurgery    Education and Discussions with Family / Patient: patient  Time Spent for Care: 30 minutes  More than 50% of total time spent on counseling and coordination of care as described above      Current Length of Stay: 1 day(s)    Current Patient Status: Observation   Certification Statement: awaiting to hear back from UR about status  Patient is not medically clear to leave, needs neurosurgery tomorrow     Discharge Plan: not medically stable  patient for surgery tomorrow  Code Status: Level 1 - Full Code      Subjective:   Patient reports doing well  He has no change in symptoms of L foot drop/paresthesias  No back pain  No fevers/chills  Objective:     Vitals:   Temp (24hrs), Av 1 °F (36 7 °C), Min:97 4 °F (36 3 °C), Max:98 7 °F (37 1 °C)    Temp:  [97 4 °F (36 3 °C)-98 7 °F (37 1 °C)] 97 4 °F (36 3 °C)  HR:  [71-94] 71  Resp:  [18] 18  BP: (126-138)/(59-91) 126/91  SpO2:  [97 %-98 %] 98 %  Body mass index is 22 02 kg/m²  Input and Output Summary (last 24 hours): Intake/Output Summary (Last 24 hours) at 10/22/18 1037  Last data filed at 10/22/18 0913   Gross per 24 hour   Intake             1840 ml   Output                0 ml   Net             1840 ml       Physical Exam:     Physical Exam   Constitutional: He is oriented to person, place, and time  No distress  Cardiovascular: Normal rate and regular rhythm  Pulmonary/Chest: Effort normal and breath sounds normal  No respiratory distress  He has no wheezes  Abdominal: Soft  Bowel sounds are normal  He exhibits no distension  There is no tenderness  Musculoskeletal: He exhibits no edema  Neurological: He is alert and oriented to person, place, and time  Slight weakness L foot    Skin: Skin is warm and dry  Psychiatric: He has a normal mood and affect  Nursing note and vitals reviewed        Additional Data:     Labs:      Results from last 7 days  Lab Units 10/20/18  1257   WBC Thousand/uL 7 10   HEMOGLOBIN g/dL 14 5   HEMATOCRIT % 42 2   PLATELETS Thousands/uL 355   NEUTROS PCT % 52   LYMPHS PCT % 36   MONOS PCT % 7   EOS PCT % 3       Results from last 7 days  Lab Units 10/20/18  1257   SODIUM mmol/L 140   POTASSIUM mmol/L 4 0   CHLORIDE mmol/L 107   CO2 mmol/L 24   BUN mg/dL 16   CREATININE mg/dL 0 75   CALCIUM mg/dL 8 8   ALK PHOS U/L 56 ALT U/L 14   AST U/L 16       Results from last 7 days  Lab Units 10/20/18  1257   INR  0 98       * I Have Reviewed All Lab Data Listed Above  * Additional Pertinent Lab Tests Reviewed: All Labs Within Last 24 Hours Reviewed    Imaging:    Imaging Reports Reviewed Today Include: MRI Lumbar spine  Imaging Personally Reviewed by Myself Includes:  none    Recent Cultures (last 7 days):           Last 24 Hours Medication List:     Current Facility-Administered Medications:  acetaminophen 650 mg Oral Q6H PRN Pamela Martinez MD   dexamethasone 4 mg Intravenous Q8H Lord Isela MD   influenza vaccine 0 5 mL Intramuscular Prior to discharge Alberto Mann MD   oxyCODONE 10 mg Oral Q4H PRN Pamela Martinez MD   oxyCODONE 5 mg Oral Q4H PRN Pamela Martinez MD   polyethylene glycol 17 g Oral Daily PRN Александр Pickens PA-C        Today, Patient Was Seen By: Александр Pickens PA-C    ** Please Note: Dictation voice to text software may have been used in the creation of this document   **

## 2018-10-22 NOTE — UTILIZATION REVIEW
Continued Stay Review    Date: 10/22/18    OBSERVATION WRITTEN 10/20/18 @ 2111 CONVERTED TO INPATIENT ADMISSION 10/22/18 @ 1227 DUE TO FURTHER DIAGNOSTIC WORKUP REQUIRED FOR LEFT LOWER EXTREMITY PARESTHESIAS AND WEAKNESS IN THE DISTAL EXTREMITY AND NEED FOR SURGICAL INTERVENTION ON 10/23/18, REQUIRING AT LEAST A 2 MIDNIGHT STAY  Vital Signs: /91 (BP Location: Right arm)   Pulse 71   Temp (!) 97 4 °F (36 3 °C) (Oral)   Resp 18   Ht 5' 10" (1 778 m)   Wt 69 6 kg (153 lb 7 oz)   SpO2 98%   BMI 22 02 kg/m²     Medications:   Scheduled Meds:   Current Facility-Administered Medications:  acetaminophen 650 mg Oral Q6H PRN   dexamethasone 4 mg Intravenous Q8H [address]   influenza vaccine 0 5 mL Intramuscular Prior to discharge   oxyCODONE 10 mg Oral Q4H PRN   oxyCODONE 5 mg Oral Q4H PRN   polyethylene glycol 17 g Oral Daily PRN     Continuous Infusions:    PRN Meds:   acetaminophen    influenza vaccine    oxyCODONE    oxyCODONE    polyethylene glycol    Age/Sex: 56 y o  male     Assessment/Plan:   * Lumbar radiculopathy   Assessment & Plan     · Patient presenting with onset left lower extremity paresthesias and weakness more on the left foot/great toe  · Has chronic lumbar disc disease and back pain      · MRI of the lumbar spine showed "Multilevel degenerative disc disease and facet arthropathy as detailedlevel by level above   There is a focal left paracentral disc protrusion posterior to the L5 vertebral body   It cannot be determined on these images whether this originates at the L4-5 or L5-S1 level given the motion artifact   This does result in displacement of the descending left-sided nerve roots   The motion artifact particularly limits assessment of the neural foramen   Neural foraminal narrowing is seen bilaterally at L4-5 and L5-S1"  · Currently no c/o back pain  · Continue IV decadron  · Neurosurgery had originally recommended trial of CINDY yesterday however today Dr Kwasi Palomino recommending surgery tomorrow   · PT rec OP PT        Weakness of left foot   Assessment & Plan     · Secondary to above  · Continue IV steroids, PT evaluation  · For surgery tomorrow         VTE Pharmacologic Prophylaxis:   Pharmacologic: not on a/c likely pending anticoagulation   Mechanical VTE Prophylaxis in Place: Yes     Certification Statement: awaiting to hear back from UR about status  Patient is not medically clear to leave, needs neurosurgery tomorrow      Discharge Plan: not medically stable  patient for surgery tomorrow  PER DR Divya Garcia, PHYSICIAN ADVISOR, NOTE DATED 10/22/18:  Barbadian Mode, DO Physician Signed   Physician Advisor Date of Service: 10/22/2018 12:21 PM      Expand All Collapse All      Current patient class: Observation  The patient is currently on Hospital Day: 3 at [address] [place] [address]       After review of the relevant documentation, labs, vital signs and test results, the patient is appropriate for INPATIENT ADMISSION         Rationale is as follows:     The patient is a 70-year-old male who was hospitalized beginning October 20, 2018 under observation status after presenting with left lower extremity paresthesias and weakness in the distal extremity  The MRI is as follows:     "Impression: Motion artifact degrades image quality  Transitional vertebral anatomy seen  The transitional vertebral body appears to represent a lumbarized S1 when correlated with prior abdominal CT August 15, 2010  No evidence of acute fracture or traumatic subluxation of the lumbar spine  Multilevel degenerative disc disease and facet arthropathy as detailed level by level above  There is a focal left paracentral disc protrusion posterior to the L5 vertebral body  It cannot be determined on these images whether this originates at the L4-5 or L5-S1 level given the motion artifact  This does result in displacement of the descending left-sided nerve roots    The motion artifact particularly limits assessment of the neural foramen  Neural foraminal narrowing is seen bilaterally at L4-5 and L5-S1 "     The patient was seen by Neurosurgery on October 21st   The diagnosis was acute on chronic low back pain with left foot drop and multilevel degenerative disc disease and facet arthropathy and radiculopathy  The patient has remained on dexamethasone 4 mg intravenous every 8 hours since presentation  This medication will continue  Progress notes from today October 22nd indicate that the patient will likely undergo surgery tomorrow  "Patient is not medically clear to leave, needs neurosurgery tomorrow"  The patient did not respond significantly during the observation period to allow for discharge home    At this time I recommend change in status to inpatient level care             The patients vitals on arrival were ED Triage Vitals   Temperature Pulse Respirations Blood Pressure SpO2   10/20/18 2037 10/20/18 2037 10/20/18 2037 10/20/18 2037 10/20/18 2037   98 3 °F (36 8 °C) 67 18 123/82 99 %       Temp Source Heart Rate Source Patient Position - Orthostatic VS BP Location FiO2 (%)   10/20/18 2037 10/22/18 0009 10/20/18 2037 10/20/18 2037 --   Oral Monitor Lying Right arm         Pain Score           10/20/18 2048           No Pain                 Medical History        Past Medical History:   Diagnosis Date    Arthritis           Surgical History         Past Surgical History:   Procedure Laterality Date    HERNIA REPAIR        KNEE SURGERY                The patient was admitted to the hospital at 2023 on 10/20/18 for the following diagnosis:  Back pain, acute [M54 9]     Consults have been placed to:   IP CONSULT TO NEUROSURGERY     Vitals          Vitals:     10/21/18 1500 10/22/18 0009 10/22/18 0809 10/22/18 0904   BP: 138/73 136/59   126/91   BP Location: Right arm Right arm   Right arm   Pulse: 90 94 71     Resp: 18 18 18     Temp: 98 7 °F (37 1 °C) 98 2 °F (36 8 °C) (!) 97 4 °F (36 3 °C)     TempSrc: Oral Oral Oral     SpO2: 97% 98%       Weight:           Height:                    Most recent labs:         Recent Labs      10/20/18   1257   WBC  7 10   HGB  14 5   HCT  42 2   PLT  355   K  4 0   NA  140   CALCIUM  8 8   BUN  16   CREATININE  0 75   INR  0 98   AST  16   ALT  14   ALKPHOS  56         Surgical procedures (if appropriate):  Procedure(s):  MIS/MetRx left L5-S1 hemiLAMINECTOMY, medial facetectmoy, & microdiscectomy               Thank you,  [facility] Review Department  Phone: [phone]; Fax [phone]  ATTENTION: Please call with any questions or concerns to [phone]  and carefully follow the prompts so that you are directed to the right person  Send all requests for admission clinical reviews, approved or denied determinations and any other requests to fax [phone]   All voicemails are confidential

## 2018-10-22 NOTE — SOCIAL WORK
CM met with patient at bedside to explain CM role and discuss d/c plan  Pt resides with his wife Irina Daniels [phone] in a 1-story home with 4 DEANDRE  Pt is independent with ADLs  No DME  No history of HHC or STR  Pt drives and is employed full-time  Preferred Rx:  [facility] in [place]  PCP: Dr Cindy Manning  No history of D/A or MH treatment reported  No POA    CM reviewed d/c planning process including the following: identifying help at home, patient preference for d/c planning needs, Discharge Lounge, [facility] Meds to Bed program, availability of treatment team to discuss questions or concerns patient and/or family may have regarding understanding medications and recognizing signs and symptoms once discharged  CM also encouraged patient to follow up with all recommended appointments after discharge  Patient advised of importance for patient and family to participate in managing patients medical well being

## 2018-10-23 ENCOUNTER — APPOINTMENT (INPATIENT)
Dept: RADIOLOGY | Facility: HOSPITAL | Age: 58
DRG: 520 | End: 2018-10-23
Payer: COMMERCIAL

## 2018-10-23 ENCOUNTER — ANESTHESIA (INPATIENT)
Dept: PERIOP | Facility: HOSPITAL | Age: 58
DRG: 520 | End: 2018-10-23
Payer: COMMERCIAL

## 2018-10-23 LAB
ATRIAL RATE: 70 BPM
GLUCOSE SERPL-MCNC: 169 MG/DL (ref 65–140)
P AXIS: 59 DEGREES
PR INTERVAL: 156 MS
QRS AXIS: 62 DEGREES
QRSD INTERVAL: 84 MS
QT INTERVAL: 386 MS
QTC INTERVAL: 416 MS
T WAVE AXIS: 58 DEGREES
VENTRICULAR RATE: 70 BPM

## 2018-10-23 PROCEDURE — 72020 X-RAY EXAM OF SPINE 1 VIEW: CPT

## 2018-10-23 PROCEDURE — 93010 ELECTROCARDIOGRAM REPORT: CPT | Performed by: INTERNAL MEDICINE

## 2018-10-23 PROCEDURE — 99232 SBSQ HOSP IP/OBS MODERATE 35: CPT | Performed by: INTERNAL MEDICINE

## 2018-10-23 PROCEDURE — 63030 LAMOT DCMPRN NRV RT 1 LMBR: CPT | Performed by: NEUROLOGICAL SURGERY

## 2018-10-23 PROCEDURE — 82948 REAGENT STRIP/BLOOD GLUCOSE: CPT

## 2018-10-23 PROCEDURE — 0SB20ZZ EXCISION OF LUMBAR VERTEBRAL DISC, OPEN APPROACH: ICD-10-PCS | Performed by: NEUROLOGICAL SURGERY

## 2018-10-23 PROCEDURE — 01NB0ZZ RELEASE LUMBAR NERVE, OPEN APPROACH: ICD-10-PCS | Performed by: NEUROLOGICAL SURGERY

## 2018-10-23 RX ORDER — METHOCARBAMOL 500 MG/1
750 TABLET, FILM COATED ORAL 4 TIMES DAILY PRN
Status: DISCONTINUED | OUTPATIENT
Start: 2018-10-23 | End: 2018-10-24 | Stop reason: HOSPADM

## 2018-10-23 RX ORDER — DOCUSATE SODIUM 100 MG/1
100 CAPSULE, LIQUID FILLED ORAL 2 TIMES DAILY
Status: DISCONTINUED | OUTPATIENT
Start: 2018-10-23 | End: 2018-10-24 | Stop reason: HOSPADM

## 2018-10-23 RX ORDER — ROCURONIUM BROMIDE 10 MG/ML
INJECTION, SOLUTION INTRAVENOUS AS NEEDED
Status: DISCONTINUED | OUTPATIENT
Start: 2018-10-23 | End: 2018-10-23 | Stop reason: SURG

## 2018-10-23 RX ORDER — LIDOCAINE HYDROCHLORIDE AND EPINEPHRINE 10; 10 MG/ML; UG/ML
INJECTION, SOLUTION INFILTRATION; PERINEURAL AS NEEDED
Status: DISCONTINUED | OUTPATIENT
Start: 2018-10-23 | End: 2018-10-23 | Stop reason: HOSPADM

## 2018-10-23 RX ORDER — SODIUM CHLORIDE, SODIUM LACTATE, POTASSIUM CHLORIDE, CALCIUM CHLORIDE 600; 310; 30; 20 MG/100ML; MG/100ML; MG/100ML; MG/100ML
50 INJECTION, SOLUTION INTRAVENOUS CONTINUOUS
Status: DISCONTINUED | OUTPATIENT
Start: 2018-10-23 | End: 2018-10-23

## 2018-10-23 RX ORDER — GLYCOPYRROLATE 0.2 MG/ML
INJECTION INTRAMUSCULAR; INTRAVENOUS AS NEEDED
Status: DISCONTINUED | OUTPATIENT
Start: 2018-10-23 | End: 2018-10-23 | Stop reason: SURG

## 2018-10-23 RX ORDER — PROPOFOL 10 MG/ML
INJECTION, EMULSION INTRAVENOUS AS NEEDED
Status: DISCONTINUED | OUTPATIENT
Start: 2018-10-23 | End: 2018-10-23 | Stop reason: SURG

## 2018-10-23 RX ORDER — SODIUM CHLORIDE 9 MG/ML
125 INJECTION, SOLUTION INTRAVENOUS CONTINUOUS
Status: DISCONTINUED | OUTPATIENT
Start: 2018-10-23 | End: 2018-10-23

## 2018-10-23 RX ORDER — MEPERIDINE HYDROCHLORIDE 25 MG/ML
12.5 INJECTION INTRAMUSCULAR; INTRAVENOUS; SUBCUTANEOUS ONCE AS NEEDED
Status: DISCONTINUED | OUTPATIENT
Start: 2018-10-23 | End: 2018-10-23

## 2018-10-23 RX ORDER — CHLORHEXIDINE GLUCONATE 0.12 MG/ML
15 RINSE ORAL ONCE
Status: COMPLETED | OUTPATIENT
Start: 2018-10-23 | End: 2018-10-23

## 2018-10-23 RX ORDER — SODIUM CHLORIDE 9 MG/ML
75 INJECTION, SOLUTION INTRAVENOUS CONTINUOUS
Status: DISCONTINUED | OUTPATIENT
Start: 2018-10-23 | End: 2018-10-24

## 2018-10-23 RX ORDER — BISACODYL 10 MG
10 SUPPOSITORY, RECTAL RECTAL DAILY PRN
Status: DISCONTINUED | OUTPATIENT
Start: 2018-10-23 | End: 2018-10-24 | Stop reason: HOSPADM

## 2018-10-23 RX ORDER — METOCLOPRAMIDE HYDROCHLORIDE 5 MG/ML
10 INJECTION INTRAMUSCULAR; INTRAVENOUS ONCE AS NEEDED
Status: DISCONTINUED | OUTPATIENT
Start: 2018-10-23 | End: 2018-10-23

## 2018-10-23 RX ORDER — ONDANSETRON 2 MG/ML
INJECTION INTRAMUSCULAR; INTRAVENOUS AS NEEDED
Status: DISCONTINUED | OUTPATIENT
Start: 2018-10-23 | End: 2018-10-23 | Stop reason: SURG

## 2018-10-23 RX ORDER — HYDROMORPHONE HCL/PF 1 MG/ML
0.2 SYRINGE (ML) INJECTION
Status: DISCONTINUED | OUTPATIENT
Start: 2018-10-23 | End: 2018-10-23

## 2018-10-23 RX ORDER — LIDOCAINE HYDROCHLORIDE 10 MG/ML
INJECTION, SOLUTION INFILTRATION; PERINEURAL AS NEEDED
Status: DISCONTINUED | OUTPATIENT
Start: 2018-10-23 | End: 2018-10-23 | Stop reason: SURG

## 2018-10-23 RX ORDER — BUPIVACAINE HYDROCHLORIDE AND EPINEPHRINE 5; 5 MG/ML; UG/ML
INJECTION, SOLUTION EPIDURAL; INTRACAUDAL; PERINEURAL AS NEEDED
Status: DISCONTINUED | OUTPATIENT
Start: 2018-10-23 | End: 2018-10-23 | Stop reason: HOSPADM

## 2018-10-23 RX ORDER — FENTANYL CITRATE 50 UG/ML
INJECTION, SOLUTION INTRAMUSCULAR; INTRAVENOUS AS NEEDED
Status: DISCONTINUED | OUTPATIENT
Start: 2018-10-23 | End: 2018-10-23 | Stop reason: SURG

## 2018-10-23 RX ORDER — METHYLPREDNISOLONE ACETATE 40 MG/ML
INJECTION, SUSPENSION INTRA-ARTICULAR; INTRALESIONAL; INTRAMUSCULAR; SOFT TISSUE AS NEEDED
Status: DISCONTINUED | OUTPATIENT
Start: 2018-10-23 | End: 2018-10-23 | Stop reason: HOSPADM

## 2018-10-23 RX ORDER — ONDANSETRON 2 MG/ML
4 INJECTION INTRAMUSCULAR; INTRAVENOUS ONCE AS NEEDED
Status: DISCONTINUED | OUTPATIENT
Start: 2018-10-23 | End: 2018-10-23

## 2018-10-23 RX ORDER — LABETALOL HYDROCHLORIDE 5 MG/ML
INJECTION, SOLUTION INTRAVENOUS AS NEEDED
Status: DISCONTINUED | OUTPATIENT
Start: 2018-10-23 | End: 2018-10-23 | Stop reason: SURG

## 2018-10-23 RX ORDER — HYDROMORPHONE HYDROCHLORIDE 2 MG/ML
INJECTION, SOLUTION INTRAMUSCULAR; INTRAVENOUS; SUBCUTANEOUS AS NEEDED
Status: DISCONTINUED | OUTPATIENT
Start: 2018-10-23 | End: 2018-10-23 | Stop reason: SURG

## 2018-10-23 RX ORDER — MIDAZOLAM HYDROCHLORIDE 1 MG/ML
INJECTION INTRAMUSCULAR; INTRAVENOUS AS NEEDED
Status: DISCONTINUED | OUTPATIENT
Start: 2018-10-23 | End: 2018-10-23 | Stop reason: SURG

## 2018-10-23 RX ORDER — FENTANYL CITRATE/PF 50 MCG/ML
50 SYRINGE (ML) INJECTION
Status: DISCONTINUED | OUTPATIENT
Start: 2018-10-23 | End: 2018-10-23

## 2018-10-23 RX ADMIN — FENTANYL CITRATE 50 MCG: 50 INJECTION, SOLUTION INTRAMUSCULAR; INTRAVENOUS at 07:28

## 2018-10-23 RX ADMIN — MIDAZOLAM 2 MG: 1 INJECTION INTRAMUSCULAR; INTRAVENOUS at 07:19

## 2018-10-23 RX ADMIN — FENTANYL CITRATE 50 MCG: 50 INJECTION, SOLUTION INTRAMUSCULAR; INTRAVENOUS at 07:45

## 2018-10-23 RX ADMIN — NEOSTIGMINE METHYLSULFATE 4 MG: 1 INJECTION, SOLUTION INTRAMUSCULAR; INTRAVENOUS; SUBCUTANEOUS at 09:30

## 2018-10-23 RX ADMIN — HYDROMORPHONE HYDROCHLORIDE 0.25 MG: 2 INJECTION, SOLUTION INTRAMUSCULAR; INTRAVENOUS; SUBCUTANEOUS at 07:50

## 2018-10-23 RX ADMIN — PROPOFOL 200 MG: 10 INJECTION, EMULSION INTRAVENOUS at 07:28

## 2018-10-23 RX ADMIN — DOCUSATE SODIUM 100 MG: 100 CAPSULE, LIQUID FILLED ORAL at 18:09

## 2018-10-23 RX ADMIN — DOCUSATE SODIUM 100 MG: 100 CAPSULE, LIQUID FILLED ORAL at 12:36

## 2018-10-23 RX ADMIN — GLYCOPYRROLATE 0.8 MG: 0.2 INJECTION, SOLUTION INTRAMUSCULAR; INTRAVENOUS at 09:30

## 2018-10-23 RX ADMIN — SODIUM CHLORIDE 75 ML/HR: 0.9 INJECTION, SOLUTION INTRAVENOUS at 23:41

## 2018-10-23 RX ADMIN — HYDROMORPHONE HYDROCHLORIDE 0.25 MG: 2 INJECTION, SOLUTION INTRAMUSCULAR; INTRAVENOUS; SUBCUTANEOUS at 08:05

## 2018-10-23 RX ADMIN — ROCURONIUM BROMIDE 50 MG: 10 INJECTION INTRAVENOUS at 07:28

## 2018-10-23 RX ADMIN — LABETALOL HYDROCHLORIDE 10 MG: 5 INJECTION, SOLUTION INTRAVENOUS at 08:50

## 2018-10-23 RX ADMIN — LIDOCAINE HYDROCHLORIDE 50 MG: 10 INJECTION, SOLUTION INFILTRATION; PERINEURAL at 07:28

## 2018-10-23 RX ADMIN — LABETALOL HYDROCHLORIDE 5 MG: 5 INJECTION, SOLUTION INTRAVENOUS at 08:10

## 2018-10-23 RX ADMIN — ONDANSETRON 4 MG: 2 INJECTION INTRAMUSCULAR; INTRAVENOUS at 09:25

## 2018-10-23 RX ADMIN — CEFAZOLIN SODIUM 2000 MG: 2 SOLUTION INTRAVENOUS at 07:23

## 2018-10-23 RX ADMIN — HYDROMORPHONE HYDROCHLORIDE 0.5 MG: 2 INJECTION, SOLUTION INTRAMUSCULAR; INTRAVENOUS; SUBCUTANEOUS at 08:15

## 2018-10-23 RX ADMIN — SODIUM CHLORIDE 75 ML/HR: 0.9 INJECTION, SOLUTION INTRAVENOUS at 10:23

## 2018-10-23 RX ADMIN — DEXAMETHASONE SODIUM PHOSPHATE 4 MG: 4 INJECTION, SOLUTION INTRAMUSCULAR; INTRAVENOUS at 05:01

## 2018-10-23 RX ADMIN — FENTANYL CITRATE 50 MCG: 50 INJECTION, SOLUTION INTRAMUSCULAR; INTRAVENOUS at 09:10

## 2018-10-23 RX ADMIN — ROCURONIUM BROMIDE 20 MG: 10 INJECTION INTRAVENOUS at 08:15

## 2018-10-23 RX ADMIN — ROCURONIUM BROMIDE 20 MG: 10 INJECTION INTRAVENOUS at 08:48

## 2018-10-23 RX ADMIN — FENTANYL CITRATE 50 MCG: 50 INJECTION, SOLUTION INTRAMUSCULAR; INTRAVENOUS at 09:35

## 2018-10-23 RX ADMIN — CHLORHEXIDINE GLUCONATE 15 ML: 1.2 RINSE ORAL at 07:09

## 2018-10-23 RX ADMIN — SODIUM CHLORIDE 125 ML/HR: 0.9 INJECTION, SOLUTION INTRAVENOUS at 05:08

## 2018-10-23 RX ADMIN — CEFAZOLIN SODIUM 1000 MG: 1 SOLUTION INTRAVENOUS at 21:06

## 2018-10-23 RX ADMIN — LABETALOL HYDROCHLORIDE 5 MG: 5 INJECTION, SOLUTION INTRAVENOUS at 09:00

## 2018-10-23 NOTE — PLAN OF CARE
Problem: Potential for Falls  Goal: Patient will remain free of falls  INTERVENTIONS:  - Assess patient frequently for physical needs  -  Identify cognitive and physical deficits and behaviors that affect risk of falls  -  [place] fall precautions as indicated by assessment   - Educate patient/family on patient safety including physical limitations  - Instruct patient to call for assistance with activity based on assessment  - Modify environment to reduce risk of injury  - Consider OT/PT consult to assist with strengthening/mobility   Outcome: Progressing      Problem: Nutrition/Hydration-ADULT  Goal: Nutrient/Hydration intake appropriate for improving, restoring or maintaining nutritional needs  Monitor and assess patient's nutrition/hydration status for malnutrition (ex- brittle hair, bruises, dry skin, pale skin and conjunctiva, muscle wasting, smooth red tongue, and disorientation)  Collaborate with interdisciplinary team and initiate plan and interventions as ordered  Monitor patient's weight and dietary intake as ordered or per policy  Utilize nutrition screening tool and intervene per policy  Determine patient's food preferences and provide high-protein, high-caloric foods as appropriate       INTERVENTIONS:  - Monitor oral intake, urinary output, labs, and treatment plans  - Assess nutrition and hydration status and recommend course of action  - Evaluate amount of meals eaten  - Assist patient with eating if necessary   - Allow adequate time for meals  - Recommend/ encourage appropriate diets, oral nutritional supplements, and vitamin/mineral supplements  - Order, calculate, and assess calorie counts as needed  - Recommend, monitor, and adjust tube feedings and TPN/PPN based on assessed needs  - Assess need for intravenous fluids  - Provide specific nutrition/hydration education as appropriate  - Include patient/family/caregiver in decisions related to nutrition   Outcome: Progressing      Problem: DISCHARGE PLANNING - CARE MANAGEMENT  Goal: Discharge to post-acute care or home with appropriate resources  INTERVENTIONS:  - Conduct assessment to determine patient/family and health care team treatment goals, and need for post-acute services based on payer coverage, community resources, and patient preferences, and barriers to discharge  - Address psychosocial, clinical, and financial barriers to discharge as identified in assessment in conjunction with the patient/family and health care team  - Arrange appropriate level of post-acute services according to patient's   needs and preference and payer coverage in collaboration with the physician and health care team  - Communicate with and update the patient/family, physician, and health care team regarding progress on the discharge plan  - Arrange appropriate transportation to post-acute venues   Outcome: Progressing

## 2018-10-23 NOTE — PROGRESS NOTES
Progress Note - Jericho Mari 1960, 56 y o  male MRN: [id number]    Unit/Bed#: [facility] 923-01 Encounter: [id number]    Primary Care Provider: Jose Davis MD   Date and time admitted to hospital: 10/20/2018  8:23 PM    * Lumbar radiculopathy   Assessment & Plan    · Patient presenting with onset left lower extremity paresthesias and weakness more on the left foot/great toe  · Has chronic lumbar disc disease and back pain  · MRI of the lumbar spine showed "Multilevel degenerative disc disease and facet arthropathy as detailedlevel by level above  There is a focal left paracentral disc protrusion posterior to the L5 vertebral body  It cannot be determined on these images whether this originates at the L4-5 or L5-S1 level given the motion artifact  This does result in displacement of the descending left-sided nerve roots  The motion artifact particularly limits assessment of the neural foramen  Neural foraminal narrowing is seen bilaterally at L4-5 and L5-S1"  · Currently no c/o back pain  SP 2 days of IV decadron  · S/P MIS/MetRx left L5-S1 hemilaminectomy, medial facetectomy and microdiscectomy earlier today with neurosurgery   · PT recommending OP PT       Weakness of left foot   Assessment & Plan    · Secondary to above  Has improved already s/p surgery today  · ?tentative dc in AM if okay with neurosurgery        VTE Pharmacologic Prophylaxis:   Pharmacologic: none s /p surgery   Mechanical VTE Prophylaxis in Place: Yes    Patient Centered Rounds: I have performed bedside rounds with nursing staff today  Discussions with Specialists or Other Care Team Provider: appreciate neurosurgery    Education and Discussions with Family / Patient: patient    Time Spent for Care: 30 minutes  More than 50% of total time spent on counseling and coordination of care as described above      Current Length of Stay: 2 day(s)    Current Patient Status: Inpatient   Certification Statement: The patient will continue to require additional inpatient hospital stay due to f/u neurosurgery     Discharge Plan: home tomorrow if okay with neurosurgery and doing well? Code Status: Level 1 - Full Code      Subjective:   Felling well s/p surgery  No pain  Able to move LLE  Objective:     Vitals:   Temp (24hrs), Av 7 °F (36 5 °C), Min:97 2 °F (36 2 °C), Max:98 4 °F (36 9 °C)    Temp:  [97 2 °F (36 2 °C)-98 4 °F (36 9 °C)] 97 6 °F (36 4 °C)  HR:  [52-74] 59  Resp:  [10-19] 14  BP: (110-147)/(68-83) 110/69  SpO2:  [95 %-99 %] 99 %  Body mass index is 22 02 kg/m²  Input and Output Summary (last 24 hours): Intake/Output Summary (Last 24 hours) at 10/23/18 1158  Last data filed at 10/23/18 0940   Gross per 24 hour   Intake             2185 ml   Output               50 ml   Net             2135 ml       Physical Exam:     Physical Exam   Constitutional: He is oriented to person, place, and time  No distress  Cardiovascular: Normal rate and regular rhythm  Pulmonary/Chest: Effort normal and breath sounds normal  No respiratory distress  Abdominal: Soft  Bowel sounds are normal  He exhibits no distension  There is no tenderness  Musculoskeletal: He exhibits no edema  Neurological: He is alert and oriented to person, place, and time  Skin: Skin is warm and dry  Psychiatric: He has a normal mood and affect  Nursing note and vitals reviewed        Additional Data:     Labs:      Results from last 7 days  Lab Units 10/20/18  1257   WBC Thousand/uL 7 10   HEMOGLOBIN g/dL 14 5   HEMATOCRIT % 42 2   PLATELETS Thousands/uL 355   NEUTROS PCT % 52   LYMPHS PCT % 36   MONOS PCT % 7   EOS PCT % 3       Results from last 7 days  Lab Units 10/20/18  1257   SODIUM mmol/L 140   POTASSIUM mmol/L 4 0   CHLORIDE mmol/L 107   CO2 mmol/L 24   BUN mg/dL 16   CREATININE mg/dL 0 75   CALCIUM mg/dL 8 8   ALK PHOS U/L 56   ALT U/L 14   AST U/L 16       Results from last 7 days  Lab Units 10/20/18  1257   INR  0 98       * I Have Reviewed All Lab Data Listed Above  * Additional Pertinent Lab Tests Reviewed: All Labs Within Last 24 Hours Reviewed    Imaging:    Imaging Reports Reviewed Today Include: none  Imaging Personally Reviewed by Myself Includes:  none    Recent Cultures (last 7 days):           Last 24 Hours Medication List:     Current Facility-Administered Medications:  acetaminophen 650 mg Oral Q6H PRN Joe Ren MD    bisacodyl 10 mg Rectal Daily PRN Ella Patrick MD    cefazolin 1,000 mg Intravenous Q8H Ella Patrick MD    dexamethasone 4 mg Intravenous Q8H [address] Joe Ren MD    docusate sodium 100 mg Oral BID Ella Patrick MD    influenza vaccine 0 5 mL Intramuscular Prior to discharge Patricia Morrison MD    methocarbamol 750 mg Oral 4x Daily PRN Ella Patrick MD    oxyCODONE 10 mg Oral Q4H PRN Joe Ren MD    oxyCODONE 5 mg Oral Q4H PRN Joe Ren MD    polyethylene glycol 17 g Oral Daily PRN Juan Parry PA-C    sodium chloride 75 mL/hr Intravenous Continuous Ella Patrick MD Last Rate: 75 mL/hr (10/23/18 1023)        Today, Patient Was Seen By: Juan Parry PA-C    ** Please Note: Dictation voice to text software may have been used in the creation of this document   **

## 2018-10-23 NOTE — PLAN OF CARE
Problem: Potential for Falls  Goal: Patient will remain free of falls  INTERVENTIONS:  - Assess patient frequently for physical needs  -  Identify cognitive and physical deficits and behaviors that affect risk of falls  -  [place] fall precautions as indicated by assessment   - Educate patient/family on patient safety including physical limitations  - Instruct patient to call for assistance with activity based on assessment  - Modify environment to reduce risk of injury  - Consider OT/PT consult to assist with strengthening/mobility   Outcome: Progressing      Problem: Nutrition/Hydration-ADULT  Goal: Nutrient/Hydration intake appropriate for improving, restoring or maintaining nutritional needs  Monitor and assess patient's nutrition/hydration status for malnutrition (ex- brittle hair, bruises, dry skin, pale skin and conjunctiva, muscle wasting, smooth red tongue, and disorientation)  Collaborate with interdisciplinary team and initiate plan and interventions as ordered  Monitor patient's weight and dietary intake as ordered or per policy  Utilize nutrition screening tool and intervene per policy  Determine patient's food preferences and provide high-protein, high-caloric foods as appropriate       INTERVENTIONS:  - Monitor oral intake, urinary output, labs, and treatment plans  - Assess nutrition and hydration status and recommend course of action  - Evaluate amount of meals eaten  - Assist patient with eating if necessary   - Allow adequate time for meals  - Recommend/ encourage appropriate diets, oral nutritional supplements, and vitamin/mineral supplements  - Order, calculate, and assess calorie counts as needed  - Recommend, monitor, and adjust tube feedings and TPN/PPN based on assessed needs  - Assess need for intravenous fluids  - Provide specific nutrition/hydration education as appropriate  - Include patient/family/caregiver in decisions related to nutrition   Outcome: Progressing      Problem: DISCHARGE PLANNING - CARE MANAGEMENT  Goal: Discharge to post-acute care or home with appropriate resources  INTERVENTIONS:  - Conduct assessment to determine patient/family and health care team treatment goals, and need for post-acute services based on payer coverage, community resources, and patient preferences, and barriers to discharge  - Address psychosocial, clinical, and financial barriers to discharge as identified in assessment in conjunction with the patient/family and health care team  - Arrange appropriate level of post-acute services according to patient's   needs and preference and payer coverage in collaboration with the physician and health care team  - Communicate with and update the patient/family, physician, and health care team regarding progress on the discharge plan  - Arrange appropriate transportation to post-acute venues   Outcome: Progressing

## 2018-10-23 NOTE — OP NOTE
Neurosurgery Operative Room Note    Mary Jo Mccabe  10/23/2018    Pre-op Diagnosis:   Lumbar radiculopathy [M54 16]  Weakness of left foot [M21 42]  Herniated lumbar intervertebral disc [M51 26]    Post-op Dignosis:     Post-Op Diagnosis Codes:     * Lumbar radiculopathy [M54 16]     * Weakness of left foot [M21 42]     * Herniated lumbar intervertebral disc [M51 26]    Procedure:  Procedure(s):  MIS/MetRx left L5-S1 hemiLAMINECTOMY, medial facetectmoy, & microdiscectomy    Surgeon: Surgeon(s) and Role:     * Chelsey Jain MD - Primary     Anesthesia: General    Staff:   Circulator: Uriah Figueroa RN  Radiology Technologist: Itzel Cohn Circulator: Caitie Soares RN  Scrub Person: Chong Ordonez  No qualified Resident was available  Estimated Blood Loss: Minimal    Specimens:                * No orders in the log *    Drains:      Findings:  Large extruded disc behind the vertebral body of what was designated L5 on the patient's preop MRI    Complications:  none    INDICATIONS    56-year-old male who has a history of back pain, but nearly week ago began with acute left leg sciatic like pain  This evolved and resolved but at the same time of onset, he noticed he had a left foot drop  This persisted  He presented to the hospital for evaluation, and an MRI scan lumbar spine demonstrated herniated disc behind the body of was designated L5  Patient had some transitional anatomy, and secondly this may well have been L4, but for the purposes of this dictation I will remain consistent with the nomenclature used in the MRI scan of the lumbar spine  The patient had clearly a foot drop on the left side, EHL perhaps 1-2/5, dorsiflexion/tibialis anterior perhaps 2-3/5  He had some numbness and pins and needles in the large toe of the left foot  I met person with the patient discussed various options for management including risks, benefits, alternatives    We discussed expected management versus diskectomy to remove this extruded disc, and hopefully allow his foot drop to improve  He wished to proceed, written consent was obtained, and he was brought to the OR during the same admission for the above take place  DETAILS OF PROCEDURE    The patient was brought to the operating room and placed under general anesthetic  Once all appropriate lines were in position, the patient was carefully turned to the prone position onto gel rolls  Arms were in the up position  Care was taken to ensure that all pressure points were padded and the neck was in a neutral position        The posterior lumbar area was prepped and draped in standard fashion  AP and lateral fluoroscopy were used to identify the L5 level        1 cm to the left of midline was marked with the parasagittal incision approximately 2 cm in length  A surgical timeout was undertaken  Antibiotics were given per protocol  One percent lidocaine with 100,000 of epinephrine was used to infiltrate the soft tissue at the incision site        A skin scalpel was used to incise the skin  Monopolar cautery was used to dissect down and through the muscle fascia  A series of Metrx dilators were placed and a final Metrix retractor was placed with lateral fluoroscopy confirming the L5 level  This was also confirmed by radiology  The operating microscope was then brought in for microdissection       Under microscopic magnification, monopolar cautery was used to remove the soft tissue off the medial facet of L5-S1, and the lamina at the L5 level  A flexible matchstick bur was used to drill the lamina as well as part of the superior L5-S1 facet and expose the underlying dura and superior aspect of the ligamentum flavum  The laminotomy was expanded, and the left L5 pedicle compelled patent  The lateral aspect of the thecal sac was identified, and dissected  The lateral edge was identified and retracted    The L5 nerve root emanated inferior, and was retracted inferior and medial   I dissected underneath the thecal sac with a ball nerve hook, and several large pieces of extruded disc came free and were extracted  Ultimately this allowed the thecal sac to drop into the ventral space, and it became well decompressed  The exiting L5 nerve root was better identified, and a foraminotomy of left L5 was performed to ensure it had sufficient decompression  I also dissected in the axilla of the L5 nerve root, down to the L5-S1 disc space  This was incised and treated with ball nerve hook, Minor curette etc to minimize a slight bulge that was still present  Bipolar cautery and thrombin soaked gelfoam were used for hemostasis  There was no sigh of durotomy  40 mg of DepoMedrol was applied epidurally        The Metrx retractor was removed and any bleeding from soft tissue was cauterized with monopolar and bipolar cautery        2-0 Vicryl suture was used in an interrupted fashion to approximate the fascia, as well as subdermally  Staples were used to approximate the skin edges  0 5% Marcaine with 1/100,000 of epinephrine was used to infiltrate the soft tissue  A Mepilex was applied       The count was correct at the end of the case and there were no complications        The patient was carefully transferred onto the operating gurney and extubated   The patient was transferred to the PACU in cardio vascularly stable condition

## 2018-10-23 NOTE — ANESTHESIA POSTPROCEDURE EVALUATION
Post-Op Assessment Note      CV Status:  Stable    Mental Status:  Alert and awake    Hydration Status:  Euvolemic    PONV Controlled:  Controlled    Airway Patency:  Patent    Post Op Vitals Reviewed: Yes          Staff: CRNA           BP (P) 147/83 (10/23/18 0942)    Temp (!) (P) 97 2 °F (36 2 °C) (10/23/18 0942)    Pulse  66   Resp   22   SpO2   95 RA

## 2018-10-23 NOTE — ASSESSMENT & PLAN NOTE
· Patient presenting with onset left lower extremity paresthesias and weakness more on the left foot/great toe  · Has chronic lumbar disc disease and back pain  · MRI of the lumbar spine showed "Multilevel degenerative disc disease and facet arthropathy as detailedlevel by level above  There is a focal left paracentral disc protrusion posterior to the L5 vertebral body  It cannot be determined on these images whether this originates at the L4-5 or L5-S1 level given the motion artifact  This does result in displacement of the descending left-sided nerve roots  The motion artifact particularly limits assessment of the neural foramen  Neural foraminal narrowing is seen bilaterally at L4-5 and L5-S1"  · Currently no c/o back pain  SP 2 days of IV decadron    · S/P MIS/MetRx left L5-S1 hemilaminectomy, medial facetectomy and microdiscectomy earlier today with neurosurgery   · PT recommending OP PT

## 2018-10-23 NOTE — ASSESSMENT & PLAN NOTE
· Secondary to above  Has improved already s/p surgery today    · ?tentative dc in AM if okay with neurosurgery

## 2018-10-23 NOTE — PHYSICAL THERAPY NOTE
PT SCREEN    PT CONSULT RECEIVED  CHART REVIEW PERFORMED  PATIENT IN OR FOR MIS/MetRx left L5-S1 hemiLAMINECTOMY, medial facetectmoy, & microdiscectomy AT THIS TIME  WILL SCREEN ORDER  PLEASE PLACE POST OPERATIVE CONSULT      Venice Day, PT

## 2018-10-24 VITALS
DIASTOLIC BLOOD PRESSURE: 87 MMHG | WEIGHT: 162.48 LBS | SYSTOLIC BLOOD PRESSURE: 138 MMHG | TEMPERATURE: 98.3 F | OXYGEN SATURATION: 98 % | HEART RATE: 101 BPM | RESPIRATION RATE: 18 BRPM | HEIGHT: 70 IN | BODY MASS INDEX: 23.26 KG/M2

## 2018-10-24 PROCEDURE — G8978 MOBILITY CURRENT STATUS: HCPCS

## 2018-10-24 PROCEDURE — G8979 MOBILITY GOAL STATUS: HCPCS

## 2018-10-24 PROCEDURE — 97164 PT RE-EVAL EST PLAN CARE: CPT

## 2018-10-24 PROCEDURE — 99239 HOSP IP/OBS DSCHRG MGMT >30: CPT | Performed by: INTERNAL MEDICINE

## 2018-10-24 PROCEDURE — 90682 RIV4 VACC RECOMBINANT DNA IM: CPT | Performed by: INTERNAL MEDICINE

## 2018-10-24 PROCEDURE — 99024 POSTOP FOLLOW-UP VISIT: CPT | Performed by: PHYSICIAN ASSISTANT

## 2018-10-24 RX ORDER — ACETAMINOPHEN 325 MG/1
650 TABLET ORAL EVERY 6 HOURS PRN
Qty: 30 TABLET | Refills: 0
Start: 2018-10-24 | End: 2019-01-29 | Stop reason: ALTCHOICE

## 2018-10-24 RX ORDER — METHOCARBAMOL 750 MG/1
750 TABLET, FILM COATED ORAL 4 TIMES DAILY PRN
Qty: 30 TABLET | Refills: 0 | Status: SHIPPED | OUTPATIENT
Start: 2018-10-24 | End: 2019-01-29 | Stop reason: ALTCHOICE

## 2018-10-24 RX ORDER — DOCUSATE SODIUM 100 MG/1
100 CAPSULE, LIQUID FILLED ORAL 2 TIMES DAILY
Qty: 10 CAPSULE | Refills: 0 | Status: SHIPPED | OUTPATIENT
Start: 2018-10-24 | End: 2018-12-03

## 2018-10-24 RX ORDER — OXYCODONE HYDROCHLORIDE 5 MG/1
5 TABLET ORAL EVERY 4 HOURS PRN
Qty: 15 TABLET | Refills: 0 | Status: SHIPPED | OUTPATIENT
Start: 2018-10-24 | End: 2018-11-03

## 2018-10-24 RX ADMIN — CEFAZOLIN SODIUM 1000 MG: 1 SOLUTION INTRAVENOUS at 05:26

## 2018-10-24 RX ADMIN — OXYCODONE HYDROCHLORIDE 5 MG: 5 TABLET ORAL at 08:04

## 2018-10-24 RX ADMIN — INFLUENZA A VIRUS A/MICHIGAN/45/2015 (H1N1) RECOMBINANT HEMAGGLUTININ ANTIGEN, INFLUENZA A VIRUS A/SINGAPORE/INFIMH-16-0019/2016 (H3N2) RECOMBINANT HEMAGGLUTININ ANTIGEN, INFLUENZA B VIRUS B/MARYLAND/15/2016 RECOMBINANT HEMAGGLUTININ ANTIGEN, AND INFLUENZA B VIRUS B/PHUKET/3073/2013 RECOMBINANT HEMAGGLUTININ ANTIGEN 0.5 ML: 45; 45; 45; 45 INJECTION INTRAMUSCULAR at 13:12

## 2018-10-24 RX ADMIN — OXYCODONE HYDROCHLORIDE 5 MG: 5 TABLET ORAL at 13:24

## 2018-10-24 RX ADMIN — DOCUSATE SODIUM 100 MG: 100 CAPSULE, LIQUID FILLED ORAL at 08:04

## 2018-10-24 RX ADMIN — METHOCARBAMOL 750 MG: 750 TABLET ORAL at 08:03

## 2018-10-24 NOTE — PROGRESS NOTES
Progress Note - Neurosurgery   Daniele Saunders 66 y o  male MRN: [id number]  Unit/Bed#: [facility] 923-01 Encounter: [id number]    Assessment:  1  POD#1 MIS/MetRx left L5-S1 hemilamniectomy, medial facetectmoy, & microdiscectomy  2  Left foot drop - slight improvement  3  Acute on chronic low back pain  4  Multilevel degenerative disc disease and facet arthropathy    Plan:  · Exam:  Alert and oriented x 3, MARTINEZ with slight improvement in strength in left foot with dorsiflexion, reflexes 2+ and symmetrical, no vivar or clonus noted, no drift  · Imaging reviewed personally and by attending  Results are as follows:  · MRI lumbar spine wo contrast (10/20/2018): Multilevel degenerative disc disease and facet arthropathy  Focal left paracentral disc protrusion posterior to the L5 vertebral body, resulting in displacement of left sided nerve root  Motion artifact present  · Medical management per primary team  · Mobilize as tolerated with assistance  · PT / OT  · DVT PPX:  SCDs (not worn during exam)  · Cleared to start pharm ppx from neurosurgery standpoint  · Neurosurgery will follow PRN hospitalization  Patient is cleared for discharge from a neurosurgical standpoint  Please call with questions or concerns  · Will follow up in 2 weeks outpatient for an incision check and 6 weeks outpatient with Dr Giovanni Beck   Chief Complaint: "I feel the same " / POD#1 MIS/MetRx left L5-S1 hemilamniectomy, medial facetectmoy, & microdiscectomy    Subjective: Patient states he has 3-4/10 pain in back around the incision, non-radiating  He was oxycodone 5mg around 8am which has helped pain  Patient states he feels minor improvement in left foot dorsiflexion  States left foot numbness is located in the big toe only with improvement in sensation in the other toes  He denies new numbness / tingling, weakness, CP, SOB, N/V, abdominal pain, bowel / bladder problems    He is urinating per baseline; no BM yet     Objective: Sitting up in bed, eating, NAD  I/O       10/22 0701 - 10/23 0700 10/23 0701 - 10/24 0700 10/24 0701 - 10/25 0700    P  O  1585 1197     I V  (mL/kg)  2471 3 (33 5)     IV Piggyback  50     Total Intake(mL/kg) 1585 (22 8) 3718 3 (50 5)     Urine (mL/kg/hr)  2300 (1 3)     Blood  50     Total Output   2350      Net +1585 +1368 3             Unmeasured Urine Occurrence 7 x 2 x     Unmeasured Stool Occurrence 1 x            Invasive Devices     Peripheral Intravenous Line            Peripheral IV 10/21/18 Left Forearm 3 days              Physical Exam:  Vitals: Blood pressure 105/78, pulse 89, temperature 98 5 °F (36 9 °C), temperature source Oral, resp  rate 18, height 5' 10" (1 778 m), weight 73 7 kg (162 lb 7 7 oz), SpO2 97 %  ,Body mass index is 23 31 kg/m²  General appearance: alert, appears stated age, cooperative and no distress  Head: Normocephalic, without obvious abnormality, atraumatic  Eyes: Conjugate gaze  Back: no kyphosis present, no tenderness to palpation, dressing dry clean intact  Lungs: non labored breathing  Heart: regular heart rate  Neurologic:   Mental status: Alert, oriented x 3, thought content appropriate  Cranial nerves: grossly intact (Cranial nerves II-XII)  Sensory: normal to LT and PP except:  slight decrease to LT on left big toe, slight decrease to PP sensation on left big toe  DST intact, JPS 3/3    Motor: moving all extremities with slight improvement in left foot dorsiflexion, strength 5/5 except:  1+/5 left foot dorsiflexion  Reflexes: 2+ and symmetric, no vivar or clonus noted  Coordination: finger to nose normal bilaterally, no drift bilaterally    Lab Results:    Results from last 7 days  Lab Units 10/20/18  1257   WBC Thousand/uL 7 10   HEMOGLOBIN g/dL 14 5   HEMATOCRIT % 42 2   PLATELETS Thousands/uL 355   NEUTROS PCT % 52   MONOS PCT % 7       Results from last 7 days  Lab Units 10/20/18  1257   SODIUM mmol/L 140   POTASSIUM mmol/L 4 0   CHLORIDE mmol/L 107   CO2 mmol/L 24   BUN mg/dL 16   CREATININE mg/dL 0 75   CALCIUM mg/dL 8 8   ALK PHOS U/L 56   ALT U/L 14   AST U/L 16       Results from last 7 days  Lab Units 10/20/18  1257   INR  0 98   PTT seconds 28     No results found for: TROPONINT  ABG:No results found for: PHART, MDG5LGB, PO2ART, KNN1BKS, P5GDIYII, BEART, SOURCE    Imaging Studies: I have personally reviewed pertinent reports  and I have personally reviewed pertinent films in PACS    Mri Lumbar Spine Wo Contrast    Result Date: 10/20/2018  Impression: Motion artifact degrades image quality  Transitional vertebral anatomy seen  The transitional vertebral body appears to represent a lumbarized S1 when correlated with prior abdominal CT August 15, 2010  No evidence of acute fracture or traumatic subluxation of the lumbar spine  Multilevel degenerative disc disease and facet arthropathy as detailed level by level above  There is a focal left paracentral disc protrusion posterior to the L5 vertebral body  It cannot be determined on these images whether this originates at the L4-5 or L5-S1 level given the motion artifact  This does result in displacement of the descending left-sided nerve roots  The motion artifact particularly limits assessment of the neural foramen  Neural foraminal narrowing is seen bilaterally at L4-5 and L5-S1  Signed by Kd Chandler MD    Xr Spine Lumbosacral 1 View    Result Date: 10/24/2018  Impression: Fluoroscopic guidance provided for surgical procedure  Please refer to the separate procedure notes for additional details  Workstation performed: [id number]     EKG, Pathology, and Other Studies: I have personally reviewed pertinent reports        VTE Pharmacologic Prophylaxis: Reason for no pharmacologic prophylaxis - cleared to start pharm ppx    VTE Mechanical Prophylaxis: sequential compression device

## 2018-10-24 NOTE — PHYSICAL THERAPY NOTE
PHYSICAL THERAPY RE-EVALUATION      Patient Name: Polo Jane  [id number] Date: 10/24/2018       Patient Active Problem List   Diagnosis    Lumbar radiculopathy    Weakness of left foot    Herniated lumbar intervertebral disc       Past Medical History:   Diagnosis Date    Arthritis        Past Surgical History:   Procedure Laterality Date    HERNIA REPAIR      KNEE SURGERY        10/24/18 1225   Note Type   Note type Re-eval   Pain Assessment   Pain Assessment 0-10   Pain Score 3   Pain Location Back   Pain Orientation Mid   Home Living   Type of Home House   Home Layout One level   Home Equipment (none)   Prior Function   Level of [place] Independent with ADLs and functional mobility   Lives With Spouse  (and mother)   Receives Help From Family   ADL Assistance Independent   IADLs Independent   Vocational Full time employment   Restrictions/Precautions   [place] Precautions Per Order No   Other Precautions Fall Risk;Pain;Spinal precautions   General   Additional Pertinent History POD#1 MIS/MetRx left L5-S1 hemilamniectomy, medial facetectmoy, & microdiscectomy   Family/Caregiver Present No   Cognition   Overall Cognitive Status WFL   Arousal/Participation Cooperative   Orientation Level Oriented X4   Memory Within functional limits   Following Commands Follows all commands and directions without difficulty   RUE Assessment   RUE Assessment WFL   LUE Assessment   LUE Assessment WFL   RLE Assessment   RLE Assessment WFL   LLE Assessment   LLE Assessment X   Strength LLE   LLE Overall Strength (grossly 5/5 except ankle DF 3-/5)   Coordination   Movements are Fluid and Coordinated 0   Coordination and Movement Description mild ataxia, scissoring   Light Touch   RLE Light Touch Grossly intact   LLE Light Touch Grossly intact  (some numbness in L foot (medial forefoot, great toe))   Bed Mobility   Supine to Sit 6  Modified independent   Sit to Supine 6  Modified independent   Transfers   Sit to Stand 5  Supervision   Additional items Assist x 1; Increased time required   Stand to Sit 5  Supervision   Additional items Assist x 1; Increased time required   Ambulation/Elevation   Gait pattern Short stride;Narrow PEG; Decreased foot clearance  (occasional near-scissoring, L foot drop)   Gait Assistance 5  Supervision   Additional items Assist x 1   Assistive Device (pt held IV pole)   Distance 150' x2   Balance   Static Sitting Good   Dynamic Sitting Fair +   Static Standing Fair   Dynamic Standing Fair -   Ambulatory Fair -   Endurance Deficit   Endurance Deficit No   Activity Tolerance   Activity Tolerance Patient tolerated treatment well   Nurse Made Aware yes   Assessment   Prognosis Good   Problem List Decreased strength; Impaired balance;Pain;Decreased mobility; Impaired sensation   Assessment Pt seen for Re-evaluation today s/p MIS/MetRx left L5-S1 hemilamniectomy, medial facetectmoy, & microdiscectomy 10/23/18  Pt mod I bed mobility, supervision for transfers and ambulation of 150 ft x2 while holding IV pole  Pt continues to demonstrate occasional scissoring, as well as previously noted L foot drop  HE may still benefit from OP PT once medically cleared for d/c  Will follow  Goals   Patient Goals to go home   STG Expiration Date 11/02/18   Short Term Goal #1 1  Complete bed mobility and transfers I to decrease need for caregiver in home  2  Ambulate 300' I to complete household and community mobility without A  3  Improve dynamic balance to good to decrease need for UE support during ambulation  4  Be educated & demonstate 12 steps to be able to enter home without A  (Goals from initial eval still appropriate)   Plan   Treatment/Interventions Functional transfer training;LE strengthening/ROM; Endurance training;Patient/family training;Bed mobility;Gait training;Elevations   PT Frequency Other (Comment)  (3-5x/wk)   Recommendation   Recommendation Outpatient PT   Barthel Index   Feeding 10   Bathing 0 Grooming Score 5   Dressing Score 10   Bladder Score 10   Bowels Score 10   Toilet Use Score 10   Transfers (Bed/Chair) Score 10   Mobility (Level Surface) Score 10   Stairs Score 5   Barthel Index Score 80       Rony Herr, PT

## 2018-10-24 NOTE — DISCHARGE INSTRUCTIONS
Neurosurgical Discharge Instructions     Please keep incision clean and dry  Avoid applying creams, lotion or antiseptic to incision area   If you have steri-strips you may remove them after 14 days if they do not fall off   Check the wound daily  If the incision becomes red, swollen, tender, warm, or has increased drainage please notify MD immediately   May shower 3 days after surgery, but do not soak in a tub and no swimming   Pat incision dry after showering   No bending or heavy lifting greater than 10lbs   No prolonged sitting greater than 30 minutes, but may walk as tolerated   Please take pain medications to relieve incision pain, and muscle relaxants to prevent spasms as directed by MD or Extender  See Med  Rec  completed at the time of discharge   No driving for 2 weeks or longer if taking narcotics or muscle relaxers   OK to be passenger in car for short distances   If taking Coumadin, Aspirin, or Plavix, you may resume these medications when cleared by Neurosurgery   To avoid constipation while on narcotics increase your water and fiber intake   May use over the counter stool softeners such as colace and senna   Limit steps to 2-3 times a day   Continue use of Incentive spirometer   Return for your follow up appointment in 2 weeks for an incision check and staple/suture removal as scheduled  Follow-up 6 weeks after surgery as scheduled  **Please notify MD if you experience a fever 101  F, chills or have increased pain, numbness, or weakness in your legs**

## 2018-10-24 NOTE — PLAN OF CARE
Problem: PHYSICAL THERAPY ADULT  Goal: Performs mobility at highest level of function for planned discharge setting  See evaluation for individualized goals  Treatment/Interventions: Functional transfer training, LE strengthening/ROM, Endurance training, Patient/family training, Bed mobility, Gait training, OT, Spoke to nursing          See flowsheet documentation for full assessment, interventions and recommendations  Outcome: Progressing  Prognosis: Good  Problem List: Decreased strength, Impaired balance, Pain, Decreased mobility, Impaired sensation  Assessment: Pt seen for Re-evaluation today s/p MIS/MetRx left L5-S1 hemilamniectomy, medial facetectmoy, & microdiscectomy 10/23/18  Pt mod I bed mobility, supervision for transfers and ambulation of 150 ft x2 while holding IV pole  Pt continues to demonstrate occasional scissoring, as well as previously noted L foot drop  HE may still benefit from OP PT once medically cleared for d/c  Will follow  Recommendation: Outpatient PT     PT - OK to Discharge: Yes (when medically stable )    See flowsheet documentation for full assessment

## 2018-10-24 NOTE — PLAN OF CARE

## 2018-10-25 ENCOUNTER — TELEPHONE (OUTPATIENT)
Dept: NEUROSURGERY | Facility: CLINIC | Age: 58
End: 2018-10-25

## 2018-10-25 NOTE — TELEPHONE ENCOUNTER
Patient was discharged from the hospital on 10/24/18  He is s/p MIS/metrx left L5-S1 hemilaminectomy, medial facetectomy, and microdiscectomy on 10/23/18 by Dr Alia Thomason  Called patient to provide post op call  Patient reports he is doing okay at home  His pain is well controlled with the pain medications and he is able to ambulate around the house  Patient states he has not had a bowel movement since Tuesday October 23, 2018  He is taking colace that was was prescribed and he is passing gas  Encouraged hydration and fiber rich foods  Patient will call in the next few days if he has not had a bowel movement  Patient denies any incisional issues at this moment  Reviewed incision care with the patient  Instructed patient to shower daily starting 72 hours after surgery (10/26/18) and to use a mild soap to cleanse the incision with gentle pressure  Instructed patient to not use any ointments, creams, or lotions on the incision  Patient is aware to call the office if any redness, swelling, drainage, dehiscence of incision, or fever >100 F occurs  Educated the patient about the importance of preventing blood clots and provided measures how to prevent them  Patient is aware to call the office if any concerns or questions may arise  Reminded patient of his upcoming appointments with the date/time/location  Patient was appreciative for the call

## 2018-10-26 ENCOUNTER — TRANSITIONAL CARE MANAGEMENT (OUTPATIENT)
Dept: FAMILY MEDICINE CLINIC | Facility: CLINIC | Age: 58
End: 2018-10-26

## 2018-10-29 ENCOUNTER — OFFICE VISIT (OUTPATIENT)
Dept: FAMILY MEDICINE CLINIC | Facility: CLINIC | Age: 58
End: 2018-10-29
Payer: COMMERCIAL

## 2018-10-29 VITALS
DIASTOLIC BLOOD PRESSURE: 78 MMHG | HEIGHT: 70 IN | TEMPERATURE: 97 F | BODY MASS INDEX: 23.34 KG/M2 | HEART RATE: 78 BPM | SYSTOLIC BLOOD PRESSURE: 130 MMHG | OXYGEN SATURATION: 98 % | WEIGHT: 163 LBS | RESPIRATION RATE: 16 BRPM

## 2018-10-29 DIAGNOSIS — E78.00 HYPERCHOLESTEROLEMIA: ICD-10-CM

## 2018-10-29 DIAGNOSIS — R29.898 WEAKNESS OF LEFT FOOT: ICD-10-CM

## 2018-10-29 DIAGNOSIS — Z98.890 STATUS POST LUMBAR LAMINECTOMY: Primary | ICD-10-CM

## 2018-10-29 DIAGNOSIS — M51.26 HERNIATED LUMBAR INTERVERTEBRAL DISC: ICD-10-CM

## 2018-10-29 PROCEDURE — 99495 TRANSJ CARE MGMT MOD F2F 14D: CPT | Performed by: INTERNAL MEDICINE

## 2018-10-29 RX ORDER — ATORVASTATIN CALCIUM 10 MG/1
10 TABLET, FILM COATED ORAL DAILY
Qty: 30 TABLET | Refills: 3 | Status: SHIPPED | OUTPATIENT
Start: 2018-10-29 | End: 2019-01-17 | Stop reason: SDUPTHER

## 2018-10-29 NOTE — PROGRESS NOTES
Hospital care reviewed:  Records reviewed  Patient was hopsitalized at:  [facility]  Date of discharge:  10/25/18  Disposition:  Home  Were the patients medicaitons reviewed and updated:  Yes  Current symptoms:  None  Post hospital issues:  None  Should patient be enrolled in anticoag monitoring?:  No  Scheduled for follow up?:  Yes  Did you obtain your prescribed medications:  Yes  Do you need help managing your perscriptions or medications:  No  Is transportation to your appointments needed:  No  Living Arrangements:  Family members  Support System:  Family  The type of support provided:  None  Do you have social support:  Yes, as much as I need  Are you recieving outpatient services:  No  Are you recieving home care services:  No  Are you using any community resources:  No  Current waiver service:  No  Have you fallen in the last 12 months:  No  Interperter language line required?:  No  Counseling:  Family

## 2018-10-29 NOTE — PROGRESS NOTES
Assessment/Plan:  Status post lumbar hemilaminectomy L5-S1 and micro diskectomy on 10/22 2018  Patient tolerated the procedure very well this procedure was done for disc herniation at this level  Numbness of the dorsum of the left foot and weakness of the left foot chronic in nature which is now improving after the surgery patient will need a physical therapy  Hypercholesterolemia  Advised to go on a low-cholesterol diet but gave him a prescription for Lipitor 10 mg daily which she will start in 10-14 days after a completely recovers from the surgery  Compulsive smoker under willing to quit smoking at this time  Patient has few tablets of oxycodone left which was given by neurosurgeon  Patient otherwise is making excellent recovery from her recent surgery  The recent lab data were reviewed and discussed with the patient  Patient had a flu shot in [facility] and all orders for this visit:    Status post lumbar laminectomy    Weakness of left foot    Herniated lumbar intervertebral disc    Hypercholesterolemia  -     atorvastatin (LIPITOR) 10 mg tablet; Take 1 tablet (10 mg total) by mouth daily        Subjective:      Patient ID: Niki Peabody is a 66 y o  male      HPI 82-year-old white male is coming in as a TCM visit  Patient has been admitted to [facility] he was admitted on 10/20/2018 and discharged on 10/24/2018  Patient had longstanding history of chronic low back pain with radiation to the left lower extremity and weakness of the left lower extremity the MRI of the lumbosacral spine was done which revealed a disc herniation at L5-S1 and patient subsequently underwent lumbar laminectomy on the L5-S1 and microdiscectomy following the procedure patient had no complications and has done well the numbness over the left foot and weakness of the left foot has steadily been increasing and improving but not today previous normal state he is scheduled to go for physiotherapy he has an appointment with the neurosurgeon in few days and will address this issue with them he is a heavy smoker under willing to quit smoking at this point his lab data and medical records were reviewed and discussed with him is CBC was normal CMP was normal his lipid panel was abnormal is total cholesterol and LDL were markedly elevated he will need to adjust his diet but was need statin therapy I gave him a prescription for Lipitor 10 mg daily he will be started after 7-10 days after he recovers from this surgery The following portions of the patient's history were reviewed and updated as appropriate: allergies, current medications, past family history, past medical history, past social history, past surgical history and problem list       Current Outpatient Prescriptions:     acetaminophen (TYLENOL) 325 mg tablet, Take 2 tablets (650 mg total) by mouth every 6 (six) hours as needed for mild pain or fever, Disp: 30 tablet, Rfl: 0    docusate sodium (COLACE) 100 mg capsule, Take 1 capsule (100 mg total) by mouth 2 (two) times a day, Disp: 10 capsule, Rfl: 0    influenza vaccine, recombinant, quadrivalent (FLUBLOK) 0 5 ML SOSY, Inject 0 5 mL into a muscle prior to discharge (Vaccination) for up to 1 dose, Disp: , Rfl: 0    methocarbamol (ROBAXIN) 750 mg tablet, Take 1 tablet (750 mg total) by mouth 4 (four) times a day as needed for muscle spasms, Disp: 30 tablet, Rfl: 0    oxyCODONE (ROXICODONE) 5 mg immediate release tablet, Take 1 tablet (5 mg total) by mouth every 4 (four) hours as needed for moderate pain for up to 10 days Max Daily Amount: 30 mg, Disp: 15 tablet, Rfl: 0    atorvastatin (LIPITOR) 10 mg tablet, Take 1 tablet (10 mg total) by mouth daily, Disp: 30 tablet, Rfl: 3    Past Medical History:   Diagnosis Date    Arthritis        Past Surgical History:   Procedure Laterality Date    HERNIA REPAIR      KNEE SURGERY      LUMBAR LAMINECTOMY N/A 10/23/2018    Procedure: MIS/MetRx left L5-S1 hemiLAMINECTOMY, medial facetectmoy, & microdiscectomy;  Surgeon: Mariposa Lisa MD;  Location: [facility];  Service: Neurosurgery       Social History       Patient Active Problem List   Diagnosis    Lumbar radiculopathy    Weakness of left foot    Herniated lumbar intervertebral disc    Status post lumbar laminectomy           Review of Systems   Constitutional: Negative  HENT: Negative  Eyes: Negative  Respiratory: Negative  Cardiovascular: Negative  Gastrointestinal: Negative  Endocrine: Negative  Genitourinary: Negative  Musculoskeletal: Negative  Skin: Negative  Allergic/Immunologic: Negative  Neurological: Negative  Hematological: Negative  Psychiatric/Behavioral: Negative  Objective:      /78   Pulse 78   Temp (!) 97 °F (36 1 °C) (Tympanic)   Resp 16   Ht 5' 10" (1 778 m)   Wt 73 9 kg (163 lb)   SpO2 98%   BMI 23 39 kg/m²          Physical Exam   Constitutional: He is oriented to person, place, and time  He appears well-developed and well-nourished  HENT:   Head: Normocephalic  Mouth/Throat: Oropharynx is clear and moist    Eyes: Pupils are equal, round, and reactive to light  Conjunctivae are normal    Neck: Normal range of motion  Neck supple  Cardiovascular: Normal rate and normal heart sounds  Pulmonary/Chest: Effort normal and breath sounds normal    Abdominal: Soft  Bowel sounds are normal    Musculoskeletal: Normal range of motion  Five metal clips are in place in the lumbar area new wound is clean and dry no discharge no infection is seen  The left foot is slightly weaker extension and flexion compared to the right the power is 3/5 right is 5/5 the lower extremity power in reflexes are completely normal   Neurological: He is alert and oriented to person, place, and time  Skin: Skin is warm and dry             Admission on 10/20/2018, Discharged on 10/24/2018   Component Date Value Ref Range Status    Hepatitis C Ab 10/22/2018 Non-reactive  Non-reactive Final    Color, UA 10/22/2018 Yellow   Final    Clarity, UA 10/22/2018 Cloudy   Final    Specific Gravity, UA 10/22/2018 1 019  1 003 - 1 030 Final    pH, UA 10/22/2018 6 5  4 5 - 8 0 Final    Leukocytes, UA 10/22/2018 Negative  Negative Final    Nitrite, UA 10/22/2018 Negative  Negative Final    Protein, UA 10/22/2018 Negative  Negative mg/dl Final    Glucose, UA 10/22/2018 Negative  Negative mg/dl Final    Ketones, UA 10/22/2018 Negative  Negative mg/dl Final    Urobilinogen, UA 10/22/2018 1 0  0 2, 1 0 E U /dl E U /dl Final    Bilirubin, UA 10/22/2018 Negative  Negative Final    Blood, UA 10/22/2018 Negative  Negative Final    ABO Grouping 10/22/2018 A   Final    Rh Factor 10/22/2018 Positive   Final    Antibody Screen 10/22/2018 Negative   Final    Specimen Expiration Date 10/22/2018 [id number]   Final    POC Glucose 10/23/2018 169* 65 - 140 mg/dl Final    Ventricular Rate 10/22/2018 70  BPM Final    Atrial Rate 10/22/2018 70  BPM Final    WV Interval 10/22/2018 156  ms Final    QRSD Interval 10/22/2018 84  ms Final    QT Interval 10/22/2018 386  ms Final    QTC Interval 10/22/2018 416  ms Final    P Axis 10/22/2018 59  degrees Final    QRS Axis 10/22/2018 62  degrees Final    T Wave Axis 10/22/2018 58  degrees Final   Admission on 10/20/2018, Discharged on 10/20/2018   Component Date Value Ref Range Status    WBC 10/20/2018 7 10  4 31 - 10 16 Thousand/uL Final    RBC 10/20/2018 4 54  3 88 - 5 62 Million/uL Final    Hemoglobin 10/20/2018 14 5  12 0 - 17 0 g/dL Final    Hematocrit 10/20/2018 42 2  42 0 - 52 0 % Final    MCV 10/20/2018 93  82 - 98 fL Final    MCH 10/20/2018 32 0  26 8 - 34 3 pg Final    MCHC 10/20/2018 34 4  31 4 - 37 4 g/dL Final    RDW 10/20/2018 13 2  11 6 - 15 1 % Final    MPV 10/20/2018 6 4* 8 9 - 12 7 fL Final    Platelets 58/54/4689 355  149 - 390 Thousands/uL Final    nRBC 10/20/2018 0  /100 WBCs Final    Neutrophils Relative 10/20/2018 52  43 - 75 % Final    Lymphocytes Relative 10/20/2018 36  14 - 44 % Final    Monocytes Relative 10/20/2018 7  4 - 12 % Final    Eosinophils Relative 10/20/2018 3  0 - 6 % Final    Basophils Relative 10/20/2018 1  0 - 1 % Final    Neutrophils Absolute 10/20/2018 3 70  1 85 - 7 62 Thousands/µL Final    Lymphocytes Absolute 10/20/2018 2 60  0 60 - 4 47 Thousands/µL Final    Monocytes Absolute 10/20/2018 0 50  0 17 - 1 22 Thousand/µL Final    Eosinophils Absolute 10/20/2018 0 20  0 00 - 0 61 Thousand/µL Final    Basophils Absolute 10/20/2018 0 10  0 00 - 0 10 Thousands/µL Final    Sodium 10/20/2018 140  134 - 143 mmol/L Final    Potassium 10/20/2018 4 0  3 5 - 5 5 mmol/L Final    Chloride 10/20/2018 107  98 - 107 mmol/L Final    CO2 10/20/2018 24  21 - 31 mmol/L Final    ANION GAP 10/20/2018 9  4 - 13 mmol/L Final    BUN 10/20/2018 16  7 - 25 mg/dL Final    Creatinine 10/20/2018 0 75  0 70 - 1 30 mg/dL Final    Standardized to IDMS reference method    Glucose 10/20/2018 100  65 - 140 mg/dL Final      If the patient is fasting, the ADA then defines impaired fasting glucose as > 100 mg/dL and diabetes as > or equal to 123 mg/dL  Specimen collection should occur prior to Sulfasalazine administration due to the potential for falsely depressed results  Specimen collection should occur prior to Sulfapyridine administration due to the potential for falsely elevated results   Calcium 10/20/2018 8 8  8 6 - 10 5 mg/dL Final    AST 10/20/2018 16  13 - 39 U/L Final      Specimen collection should occur prior to Sulfasalazine administration due to the potential for falsely depressed results   ALT 10/20/2018 14  7 - 52 U/L Final      Specimen collection should occur prior to Sulfasalazine administration due to the potential for falsely depressed results       Alkaline Phosphatase 10/20/2018 56  40 - 150 U/L Final    Total Protein 10/20/2018 6 9  6 4 - 8 9 g/dL Final    Albumin 10/20/2018 4 1  3 5 - 5 7 g/dL Final    Total Bilirubin 10/20/2018 0 50  0 20 - 1 00 mg/dL Final    eGFR 10/20/2018 103  ml/min/1 73sq m Final    Protime 10/20/2018 11 3  10 1 - 12 9 seconds Final         INR 10/20/2018 0 98  0 90 - 1 50 Final         PTT 10/20/2018 28  24 - 36 seconds Final    Therapeutic Heparin Range =  60-90 seconds   Appointment on 09/05/2018   Component Date Value Ref Range Status    Hemoglobin A1C 09/05/2018 5 5  4 2 - 6 3 % Final    EAG 09/05/2018 111  mg/dl Final    Cholesterol 09/05/2018 279* 0 - 200 mg/dL Final      Cholesterol:       Desirable         <200 mg/dl       Borderline         200-239 mg/dl       High              >239           Triglycerides 09/05/2018 197* 44 - 166 mg/dL Final      Triglyceride:     Normal          <150 mg/dl     Borderline High 150-199 mg/dl     High            200-499 mg/dl        Very High       >499 mg/dl    Specimen collection should occur prior to N-Acetylcysteine or Metamizole administration due to the potential for falsely depressed results   HDL, Direct 09/05/2018 35* 40 - 60 mg/dL Final      HDL Cholesterol:       High    >60 mg/dL       Low     <41 mg/dL  Specimen collection should occur prior to Metamizole administration due to the potential for falsley depressed results   LDL Calculated 09/05/2018 205* 0 - 100 mg/dL Final      LDL Cholesterol:     Optimal           <100 mg/dl     Near Optimal      100-129 mg/dl     Above Optimal       Borderline High 130-159 mg/dl       High            160-189 mg/dl       Very High       >189 mg/dl         This screening LDL is a calculated result  It does not have the accuracy of the Direct Measured LDL in the monitoring of patients with hyperlipidemia and/or statin therapy  Direct Measure LDL (YAY490) must be ordered separately in these patients   Non-HDL-Chol (CHOL-HDL) 09/05/2018 244  mg/dl Final       No results found

## 2018-11-06 ENCOUNTER — CLINICAL SUPPORT (OUTPATIENT)
Dept: NEUROSURGERY | Facility: CLINIC | Age: 58
End: 2018-11-06

## 2018-11-06 VITALS — SYSTOLIC BLOOD PRESSURE: 108 MMHG | DIASTOLIC BLOOD PRESSURE: 72 MMHG | TEMPERATURE: 98.3 F

## 2018-11-06 DIAGNOSIS — G62.9 NEUROPATHY: Primary | ICD-10-CM

## 2018-11-06 DIAGNOSIS — Z98.890 POST-OPERATIVE STATE: Primary | ICD-10-CM

## 2018-11-06 DIAGNOSIS — G62.9 NEUROPATHY: ICD-10-CM

## 2018-11-06 PROCEDURE — 99024 POSTOP FOLLOW-UP VISIT: CPT

## 2018-11-06 RX ORDER — GABAPENTIN 300 MG/1
CAPSULE ORAL
Qty: 60 CAPSULE | Refills: 0 | Status: SHIPPED | OUTPATIENT
Start: 2018-11-06 | End: 2019-01-29 | Stop reason: ALTCHOICE

## 2018-11-06 NOTE — PROGRESS NOTES
Post-Op Visit-Neurosurgery    Pérez Perez 66 y o  male MRN: [id number]    Chief Complaint  Patient presents post: MIS/MetRx left L5-S1 hemiLAMINECTOMY, medial facetectmoy, & microdiscectomy (N/A Spine Lumbar)    History of Present Illness  Patient presents for 2 week POV for incision check  Arrived unaccompanied and ambulated well without assistive device  Reports pain is a 5/10 in his L buttock and radiating down his leg to his foot described like a cramping sensation, and numbness in his foot that is not constant  Is not currently taking any medications for pain  Reports elevating his L foot seems to provide some relief  Assessment  Wound Exam: Incision is clean, dry, and in tact, well approximated, without, heat, redness, or drainage  Soft lump noted under the incision site  No tenderness upon palpation  Procedure  Staple/suture removal    location: Lumbar spine region  Procedure Note: 5 staples removed  Patient Status:  the patient tolerated the procedure well  Complications: None  Incision HEMALATHA  Discussion/Summary  After staple removal cleaned the incision with NSS and gauze and covered loosely for comfort  Discussed the patients continued nerve discomfort in his L leg with PA EM who prescribed Neurontin to be taken as follows: Take 1 tab daily for 3 days, then take 1 tab twice daily for 3 days, if no improvement begin taking 3 times per day  Placed order as prescribed  Reviewed incision care with patient including daily observation for s/s infection including: increased erythema, edema, drainage, dehiscence of incision or fever >101  Should these be observed, he understands that he is to call and/or return immediately for reassessment  Advised patient to continue cleansing area with mild soap and water and pat dry   Not to apply any lotions, creams, or ointments, & not to submerge in any water for two more weeks  He is to maintain activity restrictions until cleared by the surgeon  Activity levels were also reviewed with the patient in detail, he is to lift no greater than 10 pounds, advised to limit bend/twisting at the waist and ambulation is encouraged as tolerated  Verified date/time/location of upcoming POV on 12/3/2018   He is to call the office with any further questions or concerns, or if any incisional issues or fevers would arise

## 2018-12-03 ENCOUNTER — OFFICE VISIT (OUTPATIENT)
Dept: NEUROSURGERY | Facility: CLINIC | Age: 58
End: 2018-12-03

## 2018-12-03 VITALS
HEIGHT: 70 IN | DIASTOLIC BLOOD PRESSURE: 82 MMHG | WEIGHT: 172 LBS | TEMPERATURE: 97.7 F | HEART RATE: 86 BPM | SYSTOLIC BLOOD PRESSURE: 129 MMHG | BODY MASS INDEX: 24.62 KG/M2 | RESPIRATION RATE: 16 BRPM

## 2018-12-03 DIAGNOSIS — M54.16 LUMBAR RADICULOPATHY: Primary | ICD-10-CM

## 2018-12-03 DIAGNOSIS — M21.372 LEFT FOOT DROP: ICD-10-CM

## 2018-12-03 DIAGNOSIS — M51.26 HERNIATED LUMBAR INTERVERTEBRAL DISC: ICD-10-CM

## 2018-12-03 DIAGNOSIS — R29.898 WEAKNESS OF LEFT FOOT: ICD-10-CM

## 2018-12-03 PROCEDURE — 99024 POSTOP FOLLOW-UP VISIT: CPT | Performed by: NEUROLOGICAL SURGERY

## 2018-12-03 NOTE — PROGRESS NOTES
Neurosurgery Office Note  Ritchie Sheffield 66 y o  male MRN: [id number]      Assessment/Plan      Diagnoses and all orders for this visit:    Lumbar radiculopathy    Herniated lumbar intervertebral disc    Left foot drop    Weakness of left foot          Discussion:    28-year-old male who presented late 10/2018 with profound back pain that more 15 to left leg pain, and left foot drop  Back pain improved, but the leg pain, foot drop persisted  He presented to the hospital, underwent MRI scan of the lumbar spine  This demonstrated herniated disc large behind the body of L5  There is some transitional anatomy, this may well open L4, but nomenclature was that it was L5  On exam, he had EHL 1-2/5, dorsiflexion/TA 2-3/5  He had pins and needles, numbness in the large left toe  He was taken surgery for diskectomy on 10/23/18  He tolerated that well was discharged  He returns today doing well  Decisions healed well  Footdrop is improved, dorsiflexion is 4-5/5  EHL still weak, but I would say least 3/5  He has no cramping buttock pain  He has no back pain  He has occasional pins and needles in his large left toe  He has continued numbness  We discussed activity  He returned to work no restrictions  We discussed therapy  Should he have worsening or plateau in unsatisfactory fashion, he will contact us for referral to physical therapy  Otherwise we will see him back in 6 weeks for QOD assessment  CHIEF COMPLAINT    Chief Complaint   Patient presents with    Post-op     6 week POV       HISTORY    History of Present Illness     66y o  year old male     HPI    See Discussion    REVIEW OF SYSTEMS    Review of Systems   HENT: Negative  Eyes: Negative  Respiratory: Negative  Cardiovascular: Negative  Gastrointestinal: Negative  Endocrine: Negative  Genitourinary: Negative  Musculoskeletal: Positive for arthralgias and gait problem  Skin: Negative      Allergic/Immunologic: Negative  Neurological: Positive for weakness (left leg) and numbness (left foot)  Occasional sciatic pain with pressure, left leg only   Hematological: Negative  Psychiatric/Behavioral: Negative  Meds/Allergies     Current Outpatient Prescriptions   Medication Sig Dispense Refill    atorvastatin (LIPITOR) 10 mg tablet Take 1 tablet (10 mg total) by mouth daily 30 tablet 3    gabapentin (NEURONTIN) 300 mg capsule Take 1 tab daily for 3 days, then take 1 tab twice daily for 3 days, if no improvement begin taking 3 times per day  (Patient taking differently: as needed Take 1 tab daily for 3 days, then take 1 tab twice daily for 3 days, if no improvement begin taking 3 times per day  ) 60 capsule 0    acetaminophen (TYLENOL) 325 mg tablet Take 2 tablets (650 mg total) by mouth every 6 (six) hours as needed for mild pain or fever (Patient not taking: Reported on 11/6/2018 ) 30 tablet 0    influenza vaccine, recombinant, quadrivalent (FLUBLOK) 0 5 ML SOSY Inject 0 5 mL into a muscle prior to discharge (Vaccination) for up to 1 dose (Patient not taking: Reported on 11/6/2018 )  0    methocarbamol (ROBAXIN) 750 mg tablet Take 1 tablet (750 mg total) by mouth 4 (four) times a day as needed for muscle spasms (Patient not taking: Reported on 11/6/2018 ) 30 tablet 0     No current facility-administered medications for this visit          No Known Allergies    PAST HISTORY    Past Medical History:   Diagnosis Date    Arthritis     Herniated lumbar intervertebral disc     Hyperlipidemia     Lumbar radiculopathy        Past Surgical History:   Procedure Laterality Date    HERNIA REPAIR      KNEE SURGERY      LUMBAR LAMINECTOMY N/A 10/23/2018    Procedure: MIS/MetRx left L5-S1 hemiLAMINECTOMY, medial facetectmoy, & microdiscectomy;  Surgeon: Pat Barahona MD;  Location[facility];  Service: Neurosurgery       Social History   Substance Use Topics    Smoking status: Former Smoker     Packs/day: 0 50     Years: 40 00     Quit date: 12/2017    Smokeless tobacco: Never Used    Alcohol use No       Family History   Problem Relation Age of Onset    Pancreatic cancer Mother          Above history personally reviewed  EXAM    Vitals:Blood pressure 129/82, pulse 86, temperature 97 7 °F (36 5 °C), temperature source Tympanic, resp  rate 16, height 5' 10" (1 778 m), weight 78 kg (172 lb)  ,Body mass index is 24 68 kg/m²       Physical Exam    Neurologic Exam

## 2019-01-14 ENCOUNTER — OFFICE VISIT (OUTPATIENT)
Dept: NEUROSURGERY | Facility: CLINIC | Age: 59
End: 2019-01-14

## 2019-01-14 VITALS
TEMPERATURE: 97.5 F | WEIGHT: 170 LBS | HEIGHT: 70 IN | HEART RATE: 74 BPM | BODY MASS INDEX: 24.34 KG/M2 | RESPIRATION RATE: 16 BRPM | SYSTOLIC BLOOD PRESSURE: 138 MMHG | DIASTOLIC BLOOD PRESSURE: 80 MMHG

## 2019-01-14 DIAGNOSIS — Z98.890 HISTORY OF LUMBAR SURGERY: ICD-10-CM

## 2019-01-14 DIAGNOSIS — Z48.89 AFTERCARE FOLLOWING SURGERY FOR INJURY AND TRAUMA: Primary | ICD-10-CM

## 2019-01-14 DIAGNOSIS — R29.898 WEAKNESS OF LEFT FOOT: ICD-10-CM

## 2019-01-14 PROCEDURE — 99024 POSTOP FOLLOW-UP VISIT: CPT | Performed by: PHYSICIAN ASSISTANT

## 2019-01-14 NOTE — PROGRESS NOTES
Assessment/Plan:    Very pleasant 66-year-old male, returns for six-month postoperative follow-up, status post:  "MIS/MetRx left L5-S1 hemiLAMINECTOMY, medial facetectmoy, & microdiscectomy"  Performed by Dr Ramon Gaines, 10/23/18  He reports overall he is very pleased with surgical outcome, he reports he has return to all his usual activities, he makes particular note that his back pain is essentially resolved he is a trace of numbness and tingling in his left foot which is also markedly improved from preop  He reports his left foot drop/left foot weakness is essentially resolved as well and he reports he has accommodate for any residual well  He reports he has return to all his usual activities, including hunting, work full-time, and other recreational activities  His surgical site is well-healed       There is no recent imaging for review  On examination today motor examination of the upper lower extremities is 5 x 5 per power, range of motion of the back is full in flexion, extension and lateral rotation  Reflexes are intact for the lower extremities  Sensation is also grossly intact  He denies bowel or bladder incontinence, motor or sensory difficulties in the upper or lower extremities  Further follow-up with Neurosurgery will be on an as-needed basis  He does understand should he have any new symptoms or different symptoms he is to return for reassessment  These findings, impressions and recommendations are reviewed in great detail with the patient, he expressed understanding and agreement, his questions were answered completely and to his satisfaction  Follow up has been scheduled  Metrics:  BP: 1, LP: 0, EG5D3L: 1 1 1 1 1, ABIMAEL: 0%, VAS: 85           Diagnoses and all orders for this visit:    Aftercare following surgery for injury and trauma    History of lumbar surgery          No Follow-up on file  Subjective:      Patient ID: Flavio Alvarez is a 66 y o  male      HPI    The following portions of the patient's history were reviewed and updated as appropriate: allergies, current medications, past family history, past medical history, past social history and past surgical history  Review of Systems   Constitutional: Negative  HENT: Negative  Eyes: Negative  Respiratory: Negative  Cardiovascular: Negative  Gastrointestinal: Negative  Endocrine: Negative  Genitourinary: Negative  Musculoskeletal: Negative  Skin: Negative  Allergic/Immunologic: Negative  Neurological: Negative  Hematological: Negative  Psychiatric/Behavioral: Negative  Objective:    Physical Exam   Constitutional: He is oriented to person, place, and time  He appears well-developed and well-nourished  HENT:   Head: Atraumatic  Eyes: Pupils are equal, round, and reactive to light  EOM are normal    Neck: Normal range of motion  Cardiovascular: Normal rate, regular rhythm and normal heart sounds  Pulmonary/Chest: Effort normal and breath sounds normal    Musculoskeletal: Normal range of motion  Neurological: He is alert and oriented to person, place, and time  Gait normal    Reflex Scores:       Patellar reflexes are 2+ on the right side and 2+ on the left side  Skin: Skin is warm and dry  Psychiatric: He has a normal mood and affect  Neurologic Exam     Mental Status   Oriented to person, place, and time  Cranial Nerves     CN III, IV, VI   Pupils are equal, round, and reactive to light    Extraocular motions are normal      Motor Exam   Muscle bulk: normal  Overall muscle tone: normal    Strength   Right quadriceps: 5/5  Left quadriceps: 5/5  Right hamstrin/5  Left hamstrin/5  Right glutei: 5/5  Left glutei: 5/5  Right anterior tibial: 5/5  Left anterior tibial: 5/5  Right posterior tibial: 5/5  Left posterior tibial: 5/5  Right peroneal: 5/5  Left peroneal: 5/5  Right gastroc: 5/5  Left gastroc: 5/5    Sensory Exam   Light touch normal  Gait, Coordination, and Reflexes     Gait  Gait: normal    Reflexes   Right patellar: 2+  Left patellar: 2+

## 2019-01-14 NOTE — PATIENT INSTRUCTIONS
Continue with all usual activities without restriction      Further follow-up in 6 months is advised for 1 year follow-up

## 2019-01-14 NOTE — LETTER
January 14, 2019     Jeni Hoskins, [address] [place] [address] [address]    Patient: Keke Lambert   YOB: 1960   Date of Visit: 1/14/2019       Dear Dr Kathleen Mcfarlane:    Thank you for referring Diandra Mensah to me for evaluation  Below are my notes for this consultation  If you have questions, please do not hesitate to call me  I look forward to following your patient along with you  Sincerely,        Brenda Greene PA-C        CC: No Recipients  Brenda Greene PA-C  1/14/2019 11:22 AM  Sign at close encounter  Assessment/Plan:    Very pleasant 66-year-old male, returns for six-month postoperative follow-up, status post:  "MIS/MetRx left L5-S1 hemiLAMINECTOMY, medial facetectmoy, & microdiscectomy"  Performed by Dr Farooq Dubois, 10/23/18  He reports overall he is very pleased with surgical outcome, he reports he has return to all his usual activities, he makes particular note that his back pain is essentially resolved he is a trace of numbness and tingling in his left foot which is also markedly improved from preop  He reports his left foot drop/left foot weakness is essentially resolved as well and he reports he has accommodate for any residual well  He reports he has return to all his usual activities, including hunting, work full-time, and other recreational activities  His surgical site is well-healed       There is no recent imaging for review  On examination today motor examination of the upper lower extremities is 5 x 5 per power, range of motion of the back is full in flexion, extension and lateral rotation  Reflexes are intact for the lower extremities  Sensation is also grossly intact  He denies bowel or bladder incontinence, motor or sensory difficulties in the upper or lower extremities  Further follow-up with Neurosurgery will be on an as-needed basis    He does understand should he have any new symptoms or different symptoms he is to return for reassessment  These findings, impressions and recommendations are reviewed in great detail with the patient, he expressed understanding and agreement, his questions were answered completely and to his satisfaction  Follow up has been scheduled  Metrics:  BP: 1, LP: 0, EG5D3L: 1 1 1 1 1, ABIMAEL: 0%, VAS: 85           Diagnoses and all orders for this visit:    Aftercare following surgery for injury and trauma    History of lumbar surgery          No Follow-up on file  Subjective:      Patient ID: Reema Ybarra is a 66 y o  male  HPI    The following portions of the patient's history were reviewed and updated as appropriate: allergies, current medications, past family history, past medical history, past social history and past surgical history  Review of Systems   Constitutional: Negative  HENT: Negative  Eyes: Negative  Respiratory: Negative  Cardiovascular: Negative  Gastrointestinal: Negative  Endocrine: Negative  Genitourinary: Negative  Musculoskeletal: Negative  Skin: Negative  Allergic/Immunologic: Negative  Neurological: Negative  Hematological: Negative  Psychiatric/Behavioral: Negative  Objective:    Physical Exam   Constitutional: He is oriented to person, place, and time  He appears well-developed and well-nourished  HENT:   Head: Atraumatic  Eyes: Pupils are equal, round, and reactive to light  EOM are normal    Neck: Normal range of motion  Cardiovascular: Normal rate, regular rhythm and normal heart sounds  Pulmonary/Chest: Effort normal and breath sounds normal    Musculoskeletal: Normal range of motion  Neurological: He is alert and oriented to person, place, and time  Gait normal    Reflex Scores:       Patellar reflexes are 2+ on the right side and 2+ on the left side  Skin: Skin is warm and dry  Psychiatric: He has a normal mood and affect  Neurologic Exam     Mental Status   Oriented to person, place, and time  Cranial Nerves     CN III, IV, VI   Pupils are equal, round, and reactive to light    Extraocular motions are normal      Motor Exam   Muscle bulk: normal  Overall muscle tone: normal    Strength   Right quadriceps: 5/5  Left quadriceps: 5/5  Right hamstrin/5  Left hamstrin/5  Right glutei: 5/5  Left glutei: 5/5  Right anterior tibial: 5/5  Left anterior tibial: 5/5  Right posterior tibial: 5/5  Left posterior tibial: 5/5  Right peroneal: 5/5  Left peroneal: 5/5  Right gastroc: 5/5  Left gastroc: 5/5    Sensory Exam   Light touch normal      Gait, Coordination, and Reflexes     Gait  Gait: normal    Reflexes   Right patellar: 2+  Left patellar: 2+

## 2019-01-17 DIAGNOSIS — E78.00 HYPERCHOLESTEROLEMIA: ICD-10-CM

## 2019-01-17 RX ORDER — ATORVASTATIN CALCIUM 10 MG/1
10 TABLET, FILM COATED ORAL DAILY
Qty: 30 TABLET | Refills: 0 | Status: SHIPPED | OUTPATIENT
Start: 2019-01-17 | End: 2019-02-21 | Stop reason: SDUPTHER

## 2019-01-17 NOTE — TELEPHONE ENCOUNTER
Pt's wife called stating his script must be resent to [facility]  Can you please resend for pt? Thanks

## 2019-01-29 ENCOUNTER — OFFICE VISIT (OUTPATIENT)
Dept: FAMILY MEDICINE CLINIC | Facility: CLINIC | Age: 59
End: 2019-01-29
Payer: COMMERCIAL

## 2019-01-29 VITALS
HEART RATE: 81 BPM | DIASTOLIC BLOOD PRESSURE: 64 MMHG | RESPIRATION RATE: 18 BRPM | WEIGHT: 171 LBS | BODY MASS INDEX: 24.48 KG/M2 | SYSTOLIC BLOOD PRESSURE: 120 MMHG | TEMPERATURE: 98.3 F | OXYGEN SATURATION: 98 % | HEIGHT: 70 IN

## 2019-01-29 DIAGNOSIS — R29.898 WEAKNESS OF LEFT FOOT: ICD-10-CM

## 2019-01-29 DIAGNOSIS — Z29.9 PREVENTIVE MEASURE: ICD-10-CM

## 2019-01-29 DIAGNOSIS — Z98.890 STATUS POST LUMBAR LAMINECTOMY: ICD-10-CM

## 2019-01-29 DIAGNOSIS — M51.26 HERNIATED LUMBAR INTERVERTEBRAL DISC: Primary | ICD-10-CM

## 2019-01-29 DIAGNOSIS — M54.16 LUMBAR RADICULOPATHY: ICD-10-CM

## 2019-01-29 DIAGNOSIS — E78.00 HYPERCHOLESTEROLEMIA: ICD-10-CM

## 2019-01-29 PROBLEM — F17.200 SMOKER: Status: ACTIVE | Noted: 2019-01-29

## 2019-01-29 PROCEDURE — 99214 OFFICE O/P EST MOD 30 MIN: CPT | Performed by: INTERNAL MEDICINE

## 2019-01-29 PROCEDURE — 3008F BODY MASS INDEX DOCD: CPT | Performed by: INTERNAL MEDICINE

## 2019-01-29 PROCEDURE — 1036F TOBACCO NON-USER: CPT | Performed by: INTERNAL MEDICINE

## 2019-01-29 RX ORDER — ASPIRIN 81 MG/1
81 TABLET ORAL ONCE
Status: DISCONTINUED | OUTPATIENT
Start: 2019-01-29 | End: 2019-01-29

## 2019-01-29 NOTE — PROGRESS NOTES
Vitals:    01/29/19 0904   BP: 120/64   Pulse: 81   Resp: 18   Temp: 98 3 °F (36 8 °C)   TempSrc: Tympanic   SpO2: 98%   Weight: 77 6 kg (171 lb)   Height: 5' 10" (1 778 m)       Assessment/Plan:  Hypercholesterolemia  Patient is on Lipitor 10 mg daily  Will repeat CMP and lipid panel   Status post lumbar laminectomy with minimal residual left foot weakness markedly improved from before  Arthritis   Former smoker has quit smoking encouraged him to stay like that and not restart smoking    Diagnoses and all orders for this visit:    Herniated lumbar intervertebral disc    Lumbar radiculopathy    Weakness of left foot    Status post lumbar laminectomy    Hypercholesterolemia  -     Comprehensive metabolic panel; Future  -     Lipid panel; Future        Subjective:      Patient ID: Jose Louis is a 66 y o  male      HPI 65-year-old white male is coming in for a routine follow-up visit offers no new complaints  Patient is known to have history of lumbar radiculopathy status post lumbar [facility]aminectomy at [facility] October 23, 2018 and has recovered very well with minimal for left foot weakness still persisting but markedly improved from before ambulating without any assistive device he was a smoker of half pack per day for 40 years and has now stop smoking I encouraged him to stay away from cigarette he has history of hypercholesterolemia    The following portions of the patient's history were reviewed and updated as appropriate: allergies, current medications, past family history, past medical history, past social history, past surgical history and problem list       Current Outpatient Prescriptions:     atorvastatin (LIPITOR) 10 mg tablet, Take 1 tablet (10 mg total) by mouth daily, Disp: 30 tablet, Rfl: 0    Past Medical History:   Diagnosis Date    Arthritis     Herniated lumbar intervertebral disc     Hyperlipidemia     Lumbar radiculopathy        Past Surgical History:   Procedure Laterality Date  HERNIA REPAIR      KNEE SURGERY      LUMBAR LAMINECTOMY N/A 10/23/2018    Procedure: MIS/MetRx left L5-S1 hemiLAMINECTOMY, medial facetectmoy, & microdiscectomy;  Surgeon: Marilu Schmitz MD;  Location[facility];  Service: Neurosurgery       Social History       Patient Active Problem List   Diagnosis    Lumbar radiculopathy    Weakness of left foot    Herniated lumbar intervertebral disc    Status post lumbar laminectomy    Smoker           Review of Systems   Constitutional: Negative  HENT: Negative  Eyes: Negative  Respiratory: Negative  Cardiovascular: Negative  Gastrointestinal: Negative  Endocrine: Negative  Genitourinary: Negative  Musculoskeletal: Negative  Skin: Negative  Allergic/Immunologic: Negative  Neurological: Negative  Hematological: Negative  Psychiatric/Behavioral: Negative  Objective:      /64   Pulse 81   Temp 98 3 °F (36 8 °C) (Tympanic)   Resp 18   Ht 5' 10" (1 778 m)   Wt 77 6 kg (171 lb)   SpO2 98%   BMI 24 54 kg/m²          Physical Exam   Constitutional: He is oriented to person, place, and time  He appears well-developed and well-nourished  HENT:   Head: Normocephalic  Mouth/Throat: Oropharynx is clear and moist    Eyes: Pupils are equal, round, and reactive to light  Conjunctivae are normal    Neck: Normal range of motion  Neck supple  Cardiovascular: Normal rate and normal heart sounds  Pulmonary/Chest: Effort normal and breath sounds normal    Abdominal: Soft  Bowel sounds are normal    Musculoskeletal: Normal range of motion  Neurological: He is alert and oriented to person, place, and time  Skin: Skin is warm and dry             Admission on 10/20/2018, Discharged on 10/24/2018   Component Date Value Ref Range Status    Hepatitis C Ab 10/22/2018 Non-reactive  Non-reactive Final    Color, UA 10/22/2018 Yellow   Final    Clarity, UA 10/22/2018 Cloudy   Final    Specific Gravity, UA 10/22/2018 1 019 1 003 - 1 030 Final    pH, UA 10/22/2018 6 5  4 5 - 8 0 Final    Leukocytes, UA 10/22/2018 Negative  Negative Final    Nitrite, UA 10/22/2018 Negative  Negative Final    Protein, UA 10/22/2018 Negative  Negative mg/dl Final    Glucose, UA 10/22/2018 Negative  Negative mg/dl Final    Ketones, UA 10/22/2018 Negative  Negative mg/dl Final    Urobilinogen, UA 10/22/2018 1 0  0 2, 1 0 E U /dl E U /dl Final    Bilirubin, UA 10/22/2018 Negative  Negative Final    Blood, UA 10/22/2018 Negative  Negative Final    ABO Grouping 10/22/2018 A   Final    Rh Factor 10/22/2018 Positive   Final    Antibody Screen 10/22/2018 Negative   Final    Specimen Expiration Date 10/22/2018 [id number]   Final    POC Glucose 10/23/2018 169* 65 - 140 mg/dl Final    Ventricular Rate 10/22/2018 70  BPM Final    Atrial Rate 10/22/2018 70  BPM Final    [place] Interval 10/22/2018 156  ms Final    QRSD Interval 10/22/2018 84  ms Final    QT Interval 10/22/2018 386  ms Final    QTC Interval 10/22/2018 416  ms Final    P Axis 10/22/2018 59  degrees Final    QRS Axis 10/22/2018 62  degrees Final    T Wave Axis 10/22/2018 58  degrees Final   Admission on 10/20/2018, Discharged on 10/20/2018   Component Date Value Ref Range Status    WBC 10/20/2018 7 10  4 31 - 10 16 Thousand/uL Final    RBC 10/20/2018 4 54  3 88 - 5 62 Million/uL Final    Hemoglobin 10/20/2018 14 5  12 0 - 17 0 g/dL Final    Hematocrit 10/20/2018 42 2  42 0 - 52 0 % Final    MCV 10/20/2018 93  82 - 98 fL Final    MCH 10/20/2018 32 0  26 8 - 34 3 pg Final    MCHC 10/20/2018 34 4  31 4 - 37 4 g/dL Final    RDW 10/20/2018 13 2  11 6 - 15 1 % Final    MPV 10/20/2018 6 4* 8 9 - 12 7 fL Final    Platelets 39/11/1221 355  149 - 390 Thousands/uL Final    nRBC 10/20/2018 0  /100 WBCs Final    Neutrophils Relative 10/20/2018 52  43 - 75 % Final    Lymphocytes Relative 10/20/2018 36  14 - 44 % Final    Monocytes Relative 10/20/2018 7  4 - 12 % Final    Eosinophils Relative 10/20/2018 3  0 - 6 % Final    Basophils Relative 10/20/2018 1  0 - 1 % Final    Neutrophils Absolute 10/20/2018 3 70  1 85 - 7 62 Thousands/µL Final    Lymphocytes Absolute 10/20/2018 2 60  0 60 - 4 47 Thousands/µL Final    Monocytes Absolute 10/20/2018 0 50  0 17 - 1 22 Thousand/µL Final    Eosinophils Absolute 10/20/2018 0 20  0 00 - 0 61 Thousand/µL Final    Basophils Absolute 10/20/2018 0 10  0 00 - 0 10 Thousands/µL Final    Sodium 10/20/2018 140  134 - 143 mmol/L Final    Potassium 10/20/2018 4 0  3 5 - 5 5 mmol/L Final    Chloride 10/20/2018 107  98 - 107 mmol/L Final    CO2 10/20/2018 24  21 - 31 mmol/L Final    ANION GAP 10/20/2018 9  4 - 13 mmol/L Final    BUN 10/20/2018 16  7 - 25 mg/dL Final    Creatinine 10/20/2018 0 75  0 70 - 1 30 mg/dL Final    Standardized to IDMS reference method    Glucose 10/20/2018 100  65 - 140 mg/dL Final      If the patient is fasting, the ADA then defines impaired fasting glucose as > 100 mg/dL and diabetes as > or equal to 123 mg/dL  Specimen collection should occur prior to Sulfasalazine administration due to the potential for falsely depressed results  Specimen collection should occur prior to Sulfapyridine administration due to the potential for falsely elevated results   Calcium 10/20/2018 8 8  8 6 - 10 5 mg/dL Final    AST 10/20/2018 16  13 - 39 U/L Final      Specimen collection should occur prior to Sulfasalazine administration due to the potential for falsely depressed results   ALT 10/20/2018 14  7 - 52 U/L Final      Specimen collection should occur prior to Sulfasalazine administration due to the potential for falsely depressed results       Alkaline Phosphatase 10/20/2018 56  40 - 150 U/L Final    Total Protein 10/20/2018 6 9  6 4 - 8 9 g/dL Final    Albumin 10/20/2018 4 1  3 5 - 5 7 g/dL Final    Total Bilirubin 10/20/2018 0 50  0 20 - 1 00 mg/dL Final    eGFR 10/20/2018 103  ml/min/1 73sq m Final    Protime 10/20/2018 11 3 10 1 - 12 9 seconds Final         INR 10/20/2018 0 98  0 90 - 1 50 Final         PTT 10/20/2018 28  24 - 36 seconds Final    Therapeutic Heparin Range =  60-90 seconds       No results found      Social History     Social History Narrative    Lives at home with spouse and 2 grandkids       Family History   Problem Relation Age of Onset    Pancreatic cancer Mother        Past Surgical History:   Procedure Laterality Date    HERNIA REPAIR      KNEE SURGERY      LUMBAR LAMINECTOMY N/A 10/23/2018    Procedure: MIS/MetRx left L5-S1 hemiLAMINECTOMY, medial facetectmoy, & microdiscectomy;  Surgeon: Tran Mac MD;  [facility];  Service: Neurosurgery       No Known Allergies

## 2019-02-21 DIAGNOSIS — E78.00 HYPERCHOLESTEROLEMIA: ICD-10-CM

## 2019-02-21 NOTE — TELEPHONE ENCOUNTER
Patient called requesting a refill of medication  Can you please send to pharmacy on file? Thank you      Requesting: refills on Meera Katz 's Atonvastatin 10 mg 1 tab daily   #90 with Refills called into [facility]   Thank You KD

## 2019-02-22 RX ORDER — ATORVASTATIN CALCIUM 10 MG/1
10 TABLET, FILM COATED ORAL DAILY
Qty: 90 TABLET | Refills: 1 | Status: SHIPPED | OUTPATIENT
Start: 2019-02-22 | End: 2019-09-16 | Stop reason: SDUPTHER

## 2019-04-25 ENCOUNTER — TRANSCRIBE ORDERS (OUTPATIENT)
Dept: ADMINISTRATIVE | Facility: HOSPITAL | Age: 59
End: 2019-04-25

## 2019-04-25 ENCOUNTER — APPOINTMENT (OUTPATIENT)
Dept: LAB | Facility: HOSPITAL | Age: 59
End: 2019-04-25
Attending: INTERNAL MEDICINE
Payer: COMMERCIAL

## 2019-04-25 DIAGNOSIS — E78.00 HYPERCHOLESTEROLEMIA: ICD-10-CM

## 2019-04-25 LAB
ALBUMIN SERPL BCP-MCNC: 4.2 G/DL (ref 3.5–5.7)
ALP SERPL-CCNC: 69 U/L (ref 40–150)
ALT SERPL W P-5'-P-CCNC: 23 U/L (ref 7–52)
ANION GAP SERPL CALCULATED.3IONS-SCNC: 7 MMOL/L (ref 4–13)
AST SERPL W P-5'-P-CCNC: 20 U/L (ref 13–39)
BILIRUB SERPL-MCNC: 0.9 MG/DL (ref 0.2–1)
BUN SERPL-MCNC: 16 MG/DL (ref 7–25)
CALCIUM SERPL-MCNC: 9.1 MG/DL (ref 8.6–10.5)
CHLORIDE SERPL-SCNC: 105 MMOL/L (ref 98–107)
CHOLEST SERPL-MCNC: 197 MG/DL (ref 0–200)
CO2 SERPL-SCNC: 28 MMOL/L (ref 21–31)
CREAT SERPL-MCNC: 0.99 MG/DL (ref 0.7–1.3)
GFR SERPL CREATININE-BSD FRML MDRD: 85 ML/MIN/1.73SQ M
GLUCOSE P FAST SERPL-MCNC: 111 MG/DL (ref 65–99)
HDLC SERPL-MCNC: 43 MG/DL (ref 40–60)
LDLC SERPL CALC-MCNC: 141 MG/DL (ref 0–100)
NONHDLC SERPL-MCNC: 154 MG/DL
POTASSIUM SERPL-SCNC: 4 MMOL/L (ref 3.5–5.5)
PROT SERPL-MCNC: 7 G/DL (ref 6.4–8.9)
SODIUM SERPL-SCNC: 140 MMOL/L (ref 134–143)
TRIGL SERPL-MCNC: 65 MG/DL (ref 44–166)

## 2019-04-25 PROCEDURE — 80061 LIPID PANEL: CPT

## 2019-04-25 PROCEDURE — 36415 COLL VENOUS BLD VENIPUNCTURE: CPT

## 2019-04-25 PROCEDURE — 80053 COMPREHEN METABOLIC PANEL: CPT

## 2019-04-29 ENCOUNTER — OFFICE VISIT (OUTPATIENT)
Dept: FAMILY MEDICINE CLINIC | Facility: CLINIC | Age: 59
End: 2019-04-29
Payer: COMMERCIAL

## 2019-04-29 VITALS
OXYGEN SATURATION: 99 % | BODY MASS INDEX: 23.34 KG/M2 | HEIGHT: 70 IN | HEART RATE: 70 BPM | TEMPERATURE: 98.1 F | DIASTOLIC BLOOD PRESSURE: 68 MMHG | WEIGHT: 163 LBS | RESPIRATION RATE: 18 BRPM | SYSTOLIC BLOOD PRESSURE: 104 MMHG

## 2019-04-29 DIAGNOSIS — E78.00 HYPERCHOLESTEROLEMIA: ICD-10-CM

## 2019-04-29 DIAGNOSIS — R73.9 ELEVATED BLOOD SUGAR: ICD-10-CM

## 2019-04-29 DIAGNOSIS — M54.16 LUMBAR RADICULOPATHY: Primary | ICD-10-CM

## 2019-04-29 DIAGNOSIS — F17.200 SMOKER: ICD-10-CM

## 2019-04-29 PROCEDURE — 3008F BODY MASS INDEX DOCD: CPT | Performed by: INTERNAL MEDICINE

## 2019-04-29 PROCEDURE — 99214 OFFICE O/P EST MOD 30 MIN: CPT | Performed by: INTERNAL MEDICINE

## 2019-07-31 ENCOUNTER — OFFICE VISIT (OUTPATIENT)
Dept: FAMILY MEDICINE CLINIC | Facility: CLINIC | Age: 59
End: 2019-07-31
Payer: COMMERCIAL

## 2019-07-31 VITALS
HEIGHT: 70 IN | DIASTOLIC BLOOD PRESSURE: 64 MMHG | HEART RATE: 66 BPM | TEMPERATURE: 97.2 F | SYSTOLIC BLOOD PRESSURE: 120 MMHG | BODY MASS INDEX: 22.76 KG/M2 | OXYGEN SATURATION: 98 % | RESPIRATION RATE: 18 BRPM | WEIGHT: 159 LBS

## 2019-07-31 DIAGNOSIS — E78.00 HYPERCHOLESTEROLEMIA: Primary | ICD-10-CM

## 2019-07-31 DIAGNOSIS — Z83.3 FAMILY HISTORY OF DIABETES MELLITUS (DM): ICD-10-CM

## 2019-07-31 PROCEDURE — 99214 OFFICE O/P EST MOD 30 MIN: CPT | Performed by: NURSE PRACTITIONER

## 2019-07-31 PROCEDURE — 1036F TOBACCO NON-USER: CPT | Performed by: NURSE PRACTITIONER

## 2019-07-31 PROCEDURE — 3008F BODY MASS INDEX DOCD: CPT | Performed by: NURSE PRACTITIONER

## 2019-07-31 NOTE — PROGRESS NOTES
[facility]        NAME: Linda Gold is a 66 y o  male  : 1960    MRN: [id number]  DATE: 2019  TIME: 7:52 AM    Assessment and Plan   Hypercholesterolemia [E78 00]  1  Hypercholesterolemia  Lipid panel    Comprehensive metabolic panel   2  Family history of diabetes mellitus (DM)  Comprehensive metabolic panel         Patient Instructions     Patient Instructions   Get labs done, fasting for labs  Will call with results  Follow up in 6 months  Chief Complaint     Chief Complaint   Patient presents with    Follow-up     3 month follow up high cholesterol          History of Present Illness       Patient here for 3 month follow up to establish care  Prior Dr Shanae Sandoval patient  History of hypercholesterolemia and on Lipitor 10mg  Diet- watching what he's eating, cooking his own food, fruits, veggies and lean protein  S/p lumbarectomy 2018  Numbness in left foot that is improving  Quit smoking about 1 5 years ago  Also quit drinking after a DUI in the last few years  Review of Systems   Review of Systems   Constitutional: Negative for activity change, appetite change, chills, fatigue and fever  HENT: Negative for congestion, ear pain, nosebleeds, rhinorrhea and sore throat  Eyes: Negative for photophobia, pain, redness and visual disturbance  Respiratory: Negative for cough, shortness of breath and wheezing  Cardiovascular: Negative  Negative for chest pain  Gastrointestinal: Negative  Negative for abdominal pain, constipation, diarrhea and vomiting  Endocrine: Negative  Genitourinary: Negative for difficulty urinating, dysuria and flank pain  Musculoskeletal: Negative  Skin: Negative for color change and rash  Neurological: Negative for dizziness, weakness, numbness and headaches  Hematological: Negative for adenopathy  Psychiatric/Behavioral: Negative for agitation and confusion   The patient is not nervous/anxious  PHQ-9 Depression Screening    PHQ-9:    Frequency of the following problems over the past two weeks:       Little interest or pleasure in doing things:  0 - not at all  Feeling down, depressed, or hopeless:  0 - not at all  PHQ-2 Score:  0        Current Medications       Current Outpatient Medications:     aspirin 81 MG tablet, Take 1 tablet (81 mg total) by mouth daily, Disp: , Rfl:     atorvastatin (LIPITOR) 10 mg tablet, Take 1 tablet (10 mg total) by mouth daily, Disp: 90 tablet, Rfl: 1    Current Allergies     Allergies as of 07/31/2019    (No Known Allergies)            The following portions of the patient's history were reviewed and updated as appropriate: allergies, current medications, past family history, past medical history, past social history, past surgical history and problem list      Past Medical History:   Diagnosis Date    Arthritis     Herniated lumbar intervertebral disc     Hyperlipidemia     Lumbar radiculopathy        Past Surgical History:   Procedure Laterality Date    HERNIA REPAIR      KNEE SURGERY      LUMBAR LAMINECTOMY N/A 10/23/2018    Procedure: MIS/MetRx left L5-S1 hemiLAMINECTOMY, medial facetectmoy, & microdiscectomy;  Surgeon: Gin Shelley MD;  Location: [facility];  Service: Neurosurgery       Family History   Problem Relation Age of Onset    Pancreatic cancer Mother          Medications have been verified  Objective   /64   Pulse 66   Temp (!) 97 2 °F (36 2 °C)   Resp 18   Ht 5' 10" (1 778 m)   Wt 72 1 kg (159 lb)   SpO2 98%   BMI 22 81 kg/m²        Physical Exam     Physical Exam   Constitutional: He is oriented to person, place, and time  He appears well-developed and well-nourished  He is cooperative  He does not appear ill  No distress  HENT:   Head: Normocephalic and atraumatic     Right Ear: Tympanic membrane, external ear and ear canal normal    Left Ear: Tympanic membrane, external ear and ear canal normal  Nose: Nose normal  No rhinorrhea  Mouth/Throat: Uvula is midline, oropharynx is clear and moist and mucous membranes are normal    Eyes: Pupils are equal, round, and reactive to light  Conjunctivae, EOM and lids are normal    Cardiovascular: Normal rate, regular rhythm, S1 normal, S2 normal, normal heart sounds and intact distal pulses  Exam reveals no gallop and no friction rub  No murmur heard  Pulmonary/Chest: Effort normal and breath sounds normal  No respiratory distress  He has no decreased breath sounds  He has no wheezes  Abdominal: Soft  Normal appearance and bowel sounds are normal  He exhibits no distension and no mass  There is no tenderness  There is no rebound  Musculoskeletal: Normal range of motion  He exhibits no edema, tenderness or deformity  Neurological: He is alert and oriented to person, place, and time  Gross Neuro intact  Skin: Skin is warm  No rash noted  No erythema  Psychiatric: He has a normal mood and affect  His behavior is normal  Thought content normal    Nursing note and vitals reviewed

## 2019-08-26 ENCOUNTER — APPOINTMENT (OUTPATIENT)
Dept: LAB | Facility: HOSPITAL | Age: 59
End: 2019-08-26
Payer: COMMERCIAL

## 2019-08-26 ENCOUNTER — TRANSCRIBE ORDERS (OUTPATIENT)
Dept: ADMINISTRATIVE | Facility: HOSPITAL | Age: 59
End: 2019-08-26

## 2019-08-26 DIAGNOSIS — Z83.3 FAMILY HISTORY OF DIABETES MELLITUS (DM): ICD-10-CM

## 2019-08-26 DIAGNOSIS — E78.00 HYPERCHOLESTEROLEMIA: ICD-10-CM

## 2019-08-26 DIAGNOSIS — Z00.8 HEALTH EXAMINATION IN POPULATION SURVEY: Primary | ICD-10-CM

## 2019-08-26 DIAGNOSIS — Z00.8 HEALTH EXAMINATION IN POPULATION SURVEY: ICD-10-CM

## 2019-08-26 LAB
ALBUMIN SERPL BCP-MCNC: 3.6 G/DL (ref 3.5–5)
ALP SERPL-CCNC: 65 U/L (ref 46–116)
ALT SERPL W P-5'-P-CCNC: 49 U/L (ref 12–78)
ANION GAP SERPL CALCULATED.3IONS-SCNC: 2 MMOL/L (ref 4–13)
AST SERPL W P-5'-P-CCNC: 23 U/L (ref 5–45)
BILIRUB SERPL-MCNC: 0.33 MG/DL (ref 0.2–1)
BUN SERPL-MCNC: 15 MG/DL (ref 5–25)
CALCIUM SERPL-MCNC: 8.8 MG/DL (ref 8.3–10.1)
CHLORIDE SERPL-SCNC: 107 MMOL/L (ref 100–108)
CHOLEST SERPL-MCNC: 183 MG/DL (ref 50–200)
CO2 SERPL-SCNC: 28 MMOL/L (ref 21–32)
CREAT SERPL-MCNC: 0.92 MG/DL (ref 0.6–1.3)
EST. AVERAGE GLUCOSE BLD GHB EST-MCNC: 114 MG/DL
GFR SERPL CREATININE-BSD FRML MDRD: 93 ML/MIN/1.73SQ M
GLUCOSE P FAST SERPL-MCNC: 108 MG/DL (ref 65–99)
HBA1C MFR BLD: 5.6 % (ref 4.2–6.3)
HDLC SERPL-MCNC: 44 MG/DL (ref 40–60)
LDLC SERPL CALC-MCNC: 119 MG/DL (ref 0–100)
NONHDLC SERPL-MCNC: 139 MG/DL
POTASSIUM SERPL-SCNC: 4.1 MMOL/L (ref 3.5–5.3)
PROT SERPL-MCNC: 7.3 G/DL (ref 6.4–8.2)
SODIUM SERPL-SCNC: 137 MMOL/L (ref 136–145)
TRIGL SERPL-MCNC: 99 MG/DL

## 2019-08-26 PROCEDURE — 36415 COLL VENOUS BLD VENIPUNCTURE: CPT

## 2019-08-26 PROCEDURE — 80053 COMPREHEN METABOLIC PANEL: CPT

## 2019-08-26 PROCEDURE — 80061 LIPID PANEL: CPT

## 2019-08-26 PROCEDURE — 83036 HEMOGLOBIN GLYCOSYLATED A1C: CPT

## 2019-09-16 DIAGNOSIS — E78.00 HYPERCHOLESTEROLEMIA: ICD-10-CM

## 2019-09-16 RX ORDER — ATORVASTATIN CALCIUM 10 MG/1
TABLET, FILM COATED ORAL
Qty: 90 TABLET | Refills: 3 | Status: SHIPPED | OUTPATIENT
Start: 2019-09-16 | End: 2019-10-30 | Stop reason: SINTOL

## 2019-10-30 ENCOUNTER — OFFICE VISIT (OUTPATIENT)
Dept: FAMILY MEDICINE CLINIC | Facility: CLINIC | Age: 59
End: 2019-10-30
Payer: COMMERCIAL

## 2019-10-30 VITALS
OXYGEN SATURATION: 99 % | DIASTOLIC BLOOD PRESSURE: 70 MMHG | TEMPERATURE: 98.3 F | RESPIRATION RATE: 18 BRPM | HEART RATE: 82 BPM | HEIGHT: 70 IN | BODY MASS INDEX: 24.2 KG/M2 | WEIGHT: 169 LBS | SYSTOLIC BLOOD PRESSURE: 138 MMHG

## 2019-10-30 DIAGNOSIS — E78.00 HYPERCHOLESTEROLEMIA: ICD-10-CM

## 2019-10-30 DIAGNOSIS — R73.9 ELEVATED BLOOD SUGAR: ICD-10-CM

## 2019-10-30 DIAGNOSIS — Z23 NEEDS FLU SHOT: Primary | ICD-10-CM

## 2019-10-30 PROCEDURE — 99214 OFFICE O/P EST MOD 30 MIN: CPT | Performed by: NURSE PRACTITIONER

## 2019-10-30 PROCEDURE — 90682 RIV4 VACC RECOMBINANT DNA IM: CPT

## 2019-10-30 PROCEDURE — 90471 IMMUNIZATION ADMIN: CPT

## 2019-10-30 PROCEDURE — 3008F BODY MASS INDEX DOCD: CPT | Performed by: NURSE PRACTITIONER

## 2019-10-30 RX ORDER — PRAVASTATIN SODIUM 10 MG
10 TABLET ORAL
Qty: 90 TABLET | Refills: 1 | Status: SHIPPED | OUTPATIENT
Start: 2019-10-30 | End: 2020-07-23

## 2019-10-30 NOTE — PROGRESS NOTES
[facility]        NAME: Dangelo Murphy is a 64 y o  male  : 1960    MRN: [id number]  DATE: 2019  TIME: 7:32 AM    Assessment and Plan   Needs flu shot [Z23]  1  Needs flu shot  influenza vaccine, 0898-7255, quadrivalent, recombinant, PF, 0 5 mL, for patients 18 yr+ (FLUBLOK)   2  Hypercholesterolemia  pravastatin (PRAVACHOL) 10 mg tablet    Lipid panel   3  Elevated blood sugar  Comprehensive metabolic panel    HEMOGLOBIN A1C W/ EAG ESTIMATION         Patient Instructions     Patient Instructions    Start Pravachol  Call with any worsening leg cramping  Follow up in 3 months  Get labs done before next visit  Chief Complaint     Chief Complaint   Patient presents with    Follow-up     Labs    Flu Vaccine    Leg Cramps         History of Present Illness       Patient here for follow up visit for HLD and to review labs  History of elevated glucose and HLD  S/p laminectomy in 2018 with some risidual foot numbness  Labs- Lipid panel normal except LDL was 117   1 year ago LDL was 205  lipitor has been working but has been having leg cramping, thought it was all related to his back, reports slightly improved after surgery last year but has been having the leg cramping only in the left leg  Will try changing medication to see if this helps with symptoms      Review of Systems   Review of Systems   Constitutional: Negative for activity change, appetite change, chills, fatigue and fever  HENT: Negative for congestion, ear pain, nosebleeds, rhinorrhea and sore throat  Eyes: Negative for photophobia, pain, redness and visual disturbance  Respiratory: Negative for cough, shortness of breath and wheezing  Cardiovascular: Negative  Negative for chest pain  Gastrointestinal: Negative  Negative for abdominal pain, constipation, diarrhea and vomiting  Endocrine: Negative      Genitourinary: Negative for difficulty urinating, dysuria and flank pain  Musculoskeletal: Negative  Skin: Negative for color change and rash  Neurological: Positive for numbness (continues numbness in left foot since laminectomy in 2018)  Negative for dizziness, weakness and headaches  Hematological: Negative for adenopathy  Psychiatric/Behavioral: Negative for agitation and confusion  The patient is not nervous/anxious  PHQ-9 Depression Screening    PHQ-9:    Frequency of the following problems over the past two weeks:       Little interest or pleasure in doing things:  0 - not at all  Feeling down, depressed, or hopeless:  0 - not at all  PHQ-2 Score:  0        Current Medications       Current Outpatient Medications:     aspirin 81 MG tablet, Take 1 tablet (81 mg total) by mouth daily, Disp: , Rfl:     pravastatin (PRAVACHOL) 10 mg tablet, Take 1 tablet (10 mg total) by mouth daily at bedtime, Disp: 90 tablet, Rfl: 1    Current Allergies     Allergies as of 10/30/2019 - Reviewed 10/30/2019   Allergen Reaction Noted    Lipitor [atorvastatin]  10/30/2019            The following portions of the patient's history were reviewed and updated as appropriate: allergies, current medications, past family history, past medical history, past social history, past surgical history and problem list      Past Medical History:   Diagnosis Date    Arthritis     Herniated lumbar intervertebral disc     Hyperlipidemia     Lumbar radiculopathy        Past Surgical History:   Procedure Laterality Date    HERNIA REPAIR      KNEE SURGERY      LUMBAR LAMINECTOMY N/A 10/23/2018    Procedure: MIS/MetRx left L5-S1 hemiLAMINECTOMY, medial facetectmoy, & microdiscectomy;  Surgeon: Kajal French MD;  Location: [facility];  Service: Neurosurgery       Family History   Problem Relation Age of Onset    Pancreatic cancer Mother          Medications have been verified          Objective   /70   Pulse 82   Temp 98 3 °F (36 8 °C) (Tympanic)   Resp 18   Ht 5' 10" (1 778 m) Wt 76 7 kg (169 lb)   SpO2 99%   BMI 24 25 kg/m²        Physical Exam     Physical Exam   Constitutional: He is oriented to person, place, and time  He appears well-developed and well-nourished  He is cooperative  He does not appear ill  No distress  Eyes: Lids are normal    Cardiovascular: Normal rate, regular rhythm, S1 normal, S2 normal, normal heart sounds and intact distal pulses  Exam reveals no gallop and no friction rub  No murmur heard  Pulmonary/Chest: Effort normal and breath sounds normal  No respiratory distress  He has no decreased breath sounds  He has no wheezes  Abdominal: Normal appearance  Musculoskeletal: Normal range of motion  He exhibits no edema, tenderness or deformity  Neurological: He is alert and oriented to person, place, and time  Skin: Skin is warm  No rash noted  No erythema  Psychiatric: He has a normal mood and affect  His behavior is normal  Thought content normal    Nursing note and vitals reviewed

## 2019-10-30 NOTE — PATIENT INSTRUCTIONS
Start Pravachol  Call with any worsening leg cramping  Follow up in 3 months  Get labs done before next visit

## 2020-01-23 ENCOUNTER — APPOINTMENT (OUTPATIENT)
Dept: LAB | Facility: HOSPITAL | Age: 60
End: 2020-01-23
Payer: COMMERCIAL

## 2020-01-23 DIAGNOSIS — R73.9 ELEVATED BLOOD SUGAR: ICD-10-CM

## 2020-01-23 DIAGNOSIS — E78.00 HYPERCHOLESTEROLEMIA: ICD-10-CM

## 2020-01-23 LAB
ALBUMIN SERPL BCP-MCNC: 3.6 G/DL (ref 3.5–5)
ALP SERPL-CCNC: 69 U/L (ref 46–116)
ALT SERPL W P-5'-P-CCNC: 32 U/L (ref 12–78)
ANION GAP SERPL CALCULATED.3IONS-SCNC: 4 MMOL/L (ref 4–13)
AST SERPL W P-5'-P-CCNC: 20 U/L (ref 5–45)
BILIRUB SERPL-MCNC: 0.4 MG/DL (ref 0.2–1)
BUN SERPL-MCNC: 9 MG/DL (ref 5–25)
CALCIUM SERPL-MCNC: 8.6 MG/DL (ref 8.3–10.1)
CHLORIDE SERPL-SCNC: 110 MMOL/L (ref 100–108)
CHOLEST SERPL-MCNC: 210 MG/DL (ref 50–200)
CO2 SERPL-SCNC: 28 MMOL/L (ref 21–32)
CREAT SERPL-MCNC: 0.95 MG/DL (ref 0.6–1.3)
EST. AVERAGE GLUCOSE BLD GHB EST-MCNC: 114 MG/DL
GFR SERPL CREATININE-BSD FRML MDRD: 88 ML/MIN/1.73SQ M
GLUCOSE P FAST SERPL-MCNC: 119 MG/DL (ref 65–99)
HBA1C MFR BLD: 5.6 % (ref 4.2–6.3)
HDLC SERPL-MCNC: 46 MG/DL
LDLC SERPL CALC-MCNC: 150 MG/DL (ref 0–100)
NONHDLC SERPL-MCNC: 164 MG/DL
POTASSIUM SERPL-SCNC: 4.2 MMOL/L (ref 3.5–5.3)
PROT SERPL-MCNC: 7.5 G/DL (ref 6.4–8.2)
SODIUM SERPL-SCNC: 142 MMOL/L (ref 136–145)
TRIGL SERPL-MCNC: 72 MG/DL

## 2020-01-23 PROCEDURE — 80053 COMPREHEN METABOLIC PANEL: CPT

## 2020-01-23 PROCEDURE — 36415 COLL VENOUS BLD VENIPUNCTURE: CPT

## 2020-01-23 PROCEDURE — 80061 LIPID PANEL: CPT

## 2020-01-23 PROCEDURE — 83036 HEMOGLOBIN GLYCOSYLATED A1C: CPT

## 2020-01-29 ENCOUNTER — OFFICE VISIT (OUTPATIENT)
Dept: FAMILY MEDICINE CLINIC | Facility: CLINIC | Age: 60
End: 2020-01-29
Payer: COMMERCIAL

## 2020-01-29 VITALS
RESPIRATION RATE: 16 BRPM | OXYGEN SATURATION: 99 % | TEMPERATURE: 98.8 F | HEIGHT: 70 IN | HEART RATE: 81 BPM | SYSTOLIC BLOOD PRESSURE: 126 MMHG | DIASTOLIC BLOOD PRESSURE: 78 MMHG | BODY MASS INDEX: 25.48 KG/M2 | WEIGHT: 178 LBS

## 2020-01-29 DIAGNOSIS — E78.00 HYPERCHOLESTEROLEMIA: Primary | ICD-10-CM

## 2020-01-29 DIAGNOSIS — R73.9 ELEVATED BLOOD SUGAR: ICD-10-CM

## 2020-01-29 PROCEDURE — 99214 OFFICE O/P EST MOD 30 MIN: CPT | Performed by: NURSE PRACTITIONER

## 2020-01-29 NOTE — PATIENT INSTRUCTIONS
Follow up in 4 months  Recheck labs before next visit  Watch carbohydrates and sugars  Discussed low carb diet

## 2020-01-29 NOTE — PROGRESS NOTES
[facility]        NAME: Bjorn Dumont is a 64 y o  male  : 1960    MRN: [id number]  DATE: 2020  TIME: 7:24 AM    Assessment and Plan   Hypercholesterolemia [E78 00]  1  Hypercholesterolemia  Lipid panel   2  Elevated blood sugar  Comprehensive metabolic panel    HEMOGLOBIN A1C W/ EAG ESTIMATION     BMI Counseling: Body mass index is 25 54 kg/m²  The BMI is above normal  Nutrition recommendations include encouraging healthy choices of fruits and vegetables, consuming healthier snacks, moderation in carbohydrate intake, increasing intake of lean protein, reducing intake of saturated and trans fat and reducing intake of cholesterol  Exercise recommendations include exercising 3-5 times per week  Patient Instructions     Patient Instructions   Follow up in 4 months  Recheck labs before next visit  Watch carbohydrates and sugars  Discussed low carb diet  Chief Complaint     Chief Complaint   Patient presents with    Follow-up     Labs         History of Present Illness        Patient here for follow up visit  HLD-  Was taking Lipitor but was causing muscle cramping, switched to pravastatin about 2 months ago, rechecked Lipids and cholesterol and LDL had increased since last checked  Chol- 210 and   Elevated fasting blood sugar- BS elevated at 108 3 months ago, last fasting sugar was 119 and HgbA1c was 5 6  Reports mother is prediabetic and takes metformin  Borderline prediabetes  Review of Systems   Review of Systems   Constitutional: Negative for activity change, appetite change, chills, fatigue and fever  HENT: Negative for congestion, ear pain, nosebleeds, rhinorrhea and sore throat  Eyes: Negative for photophobia, pain, redness and visual disturbance  Respiratory: Negative for cough, shortness of breath and wheezing  Cardiovascular: Negative  Negative for chest pain  Gastrointestinal: Negative    Negative for abdominal pain, constipation, diarrhea and vomiting  Endocrine: Negative  Genitourinary: Negative for difficulty urinating, dysuria and flank pain  Musculoskeletal: Negative  Skin: Negative for color change and rash  Neurological: Negative for dizziness, weakness, numbness and headaches  Hematological: Negative for adenopathy  Psychiatric/Behavioral: Negative for agitation and confusion  The patient is not nervous/anxious  PHQ-9 Depression Screening    PHQ-9:    Frequency of the following problems over the past two weeks:       Little interest or pleasure in doing things:  0 - not at all  Feeling down, depressed, or hopeless:  0 - not at all  PHQ-2 Score:  0        Current Medications       Current Outpatient Medications:     aspirin 81 MG tablet, Take 1 tablet (81 mg total) by mouth daily, Disp: , Rfl:     pravastatin (PRAVACHOL) 10 mg tablet, Take 1 tablet (10 mg total) by mouth daily at bedtime, Disp: 90 tablet, Rfl: 1    Current Allergies     Allergies as of 01/29/2020 - Reviewed 01/29/2020   Allergen Reaction Noted    Lipitor [atorvastatin]  10/30/2019            The following portions of the patient's history were reviewed and updated as appropriate: allergies, current medications, past family history, past medical history, past social history, past surgical history and problem list      Past Medical History:   Diagnosis Date    Arthritis     Herniated lumbar intervertebral disc     Hyperlipidemia     Lumbar radiculopathy        Past Surgical History:   Procedure Laterality Date    HERNIA REPAIR      KNEE SURGERY      LUMBAR LAMINECTOMY N/A 10/23/2018    Procedure: MIS/MetRx left L5-S1 hemiLAMINECTOMY, medial facetectmoy, & microdiscectomy;  Surgeon: Drew Javed MD;  Location: [facility];  Service: Neurosurgery       Family History   Problem Relation Age of Onset    Pancreatic cancer Mother          Medications have been verified          Objective   /78   Pulse 81   Temp 98 8 °F (37 1 °C) (Tympanic)   Resp 16   Ht 5' 10" (1 778 m)   Wt 80 7 kg (178 lb)   SpO2 99%   BMI 25 54 kg/m²        Physical Exam     Physical Exam   Constitutional: He is oriented to person, place, and time  He appears well-developed and well-nourished  He is cooperative  He does not appear ill  No distress  Eyes: Lids are normal    Cardiovascular: Normal rate, regular rhythm, S1 normal, S2 normal, normal heart sounds and intact distal pulses  Exam reveals no gallop and no friction rub  No murmur heard  Pulmonary/Chest: Effort normal and breath sounds normal  No respiratory distress  He has no decreased breath sounds  He has no wheezes  Abdominal: Normal appearance  Musculoskeletal: Normal range of motion  He exhibits no edema, tenderness or deformity  Neurological: He is alert and oriented to person, place, and time  Skin: Skin is warm  No rash noted  No erythema  Psychiatric: He has a normal mood and affect  His behavior is normal  Thought content normal    Nursing note and vitals reviewed

## 2020-05-27 ENCOUNTER — OFFICE VISIT (OUTPATIENT)
Dept: FAMILY MEDICINE CLINIC | Facility: CLINIC | Age: 60
End: 2020-05-27
Payer: COMMERCIAL

## 2020-05-27 VITALS
TEMPERATURE: 97.7 F | WEIGHT: 172 LBS | HEART RATE: 77 BPM | RESPIRATION RATE: 18 BRPM | DIASTOLIC BLOOD PRESSURE: 76 MMHG | HEIGHT: 70 IN | SYSTOLIC BLOOD PRESSURE: 112 MMHG | OXYGEN SATURATION: 98 % | BODY MASS INDEX: 24.62 KG/M2

## 2020-05-27 DIAGNOSIS — E78.00 HYPERCHOLESTEROLEMIA: ICD-10-CM

## 2020-05-27 DIAGNOSIS — Z00.00 ANNUAL PHYSICAL EXAM: Primary | ICD-10-CM

## 2020-05-27 PROBLEM — R29.898 WEAKNESS OF LEFT FOOT: Status: RESOLVED | Noted: 2018-10-20 | Resolved: 2020-05-27

## 2020-05-27 PROCEDURE — 99396 PREV VISIT EST AGE 40-64: CPT | Performed by: NURSE PRACTITIONER

## 2020-05-29 ENCOUNTER — APPOINTMENT (OUTPATIENT)
Dept: LAB | Facility: CLINIC | Age: 60
End: 2020-05-29
Payer: COMMERCIAL

## 2020-05-29 DIAGNOSIS — E78.00 HYPERCHOLESTEROLEMIA: ICD-10-CM

## 2020-05-29 DIAGNOSIS — Z00.00 ANNUAL PHYSICAL EXAM: ICD-10-CM

## 2020-05-29 LAB
ALBUMIN SERPL BCP-MCNC: 3.8 G/DL (ref 3.5–5)
ALP SERPL-CCNC: 67 U/L (ref 46–116)
ALT SERPL W P-5'-P-CCNC: 31 U/L (ref 12–78)
ANION GAP SERPL CALCULATED.3IONS-SCNC: 6 MMOL/L (ref 4–13)
AST SERPL W P-5'-P-CCNC: 16 U/L (ref 5–45)
BILIRUB SERPL-MCNC: 0.44 MG/DL (ref 0.2–1)
BUN SERPL-MCNC: 20 MG/DL (ref 5–25)
CALCIUM SERPL-MCNC: 9.2 MG/DL (ref 8.3–10.1)
CHLORIDE SERPL-SCNC: 112 MMOL/L (ref 100–108)
CHOLEST SERPL-MCNC: 235 MG/DL (ref 50–200)
CO2 SERPL-SCNC: 22 MMOL/L (ref 21–32)
CREAT SERPL-MCNC: 0.97 MG/DL (ref 0.6–1.3)
EST. AVERAGE GLUCOSE BLD GHB EST-MCNC: 111 MG/DL
GFR SERPL CREATININE-BSD FRML MDRD: 86 ML/MIN/1.73SQ M
GLUCOSE P FAST SERPL-MCNC: 115 MG/DL (ref 65–99)
HBA1C MFR BLD: 5.5 %
HDLC SERPL-MCNC: 42 MG/DL
LDLC SERPL CALC-MCNC: 178 MG/DL (ref 0–100)
NONHDLC SERPL-MCNC: 193 MG/DL
POTASSIUM SERPL-SCNC: 3.8 MMOL/L (ref 3.5–5.3)
PROT SERPL-MCNC: 7.7 G/DL (ref 6.4–8.2)
SODIUM SERPL-SCNC: 140 MMOL/L (ref 136–145)
TRIGL SERPL-MCNC: 75 MG/DL

## 2020-05-29 PROCEDURE — 83036 HEMOGLOBIN GLYCOSYLATED A1C: CPT

## 2020-05-29 PROCEDURE — 80061 LIPID PANEL: CPT

## 2020-05-29 PROCEDURE — 80053 COMPREHEN METABOLIC PANEL: CPT

## 2020-05-29 PROCEDURE — 36415 COLL VENOUS BLD VENIPUNCTURE: CPT

## 2020-06-09 DIAGNOSIS — E78.00 HYPERCHOLESTEROLEMIA: Primary | ICD-10-CM

## 2020-07-23 DIAGNOSIS — E78.00 HYPERCHOLESTEROLEMIA: ICD-10-CM

## 2020-07-23 RX ORDER — PRAVASTATIN SODIUM 10 MG
TABLET ORAL
Qty: 90 TABLET | Refills: 0 | Status: SHIPPED | OUTPATIENT
Start: 2020-07-23 | End: 2020-07-24 | Stop reason: SDUPTHER

## 2020-07-24 DIAGNOSIS — E78.00 HYPERCHOLESTEROLEMIA: ICD-10-CM

## 2020-07-24 RX ORDER — PRAVASTATIN SODIUM 10 MG
10 TABLET ORAL
Qty: 90 TABLET | Refills: 1 | Status: SHIPPED | OUTPATIENT
Start: 2020-07-24 | End: 2020-12-14

## 2020-12-14 ENCOUNTER — OFFICE VISIT (OUTPATIENT)
Dept: FAMILY MEDICINE CLINIC | Facility: CLINIC | Age: 60
End: 2020-12-14
Payer: COMMERCIAL

## 2020-12-14 VITALS
BODY MASS INDEX: 23.62 KG/M2 | HEART RATE: 77 BPM | WEIGHT: 165 LBS | DIASTOLIC BLOOD PRESSURE: 80 MMHG | RESPIRATION RATE: 20 BRPM | SYSTOLIC BLOOD PRESSURE: 124 MMHG | HEIGHT: 70 IN | OXYGEN SATURATION: 98 % | TEMPERATURE: 96.7 F

## 2020-12-14 DIAGNOSIS — Z23 ENCOUNTER FOR IMMUNIZATION: Primary | ICD-10-CM

## 2020-12-14 DIAGNOSIS — E78.00 HYPERCHOLESTEROLEMIA: ICD-10-CM

## 2020-12-14 PROCEDURE — 90471 IMMUNIZATION ADMIN: CPT

## 2020-12-14 PROCEDURE — 99214 OFFICE O/P EST MOD 30 MIN: CPT | Performed by: NURSE PRACTITIONER

## 2020-12-14 PROCEDURE — 90682 RIV4 VACC RECOMBINANT DNA IM: CPT

## 2020-12-14 RX ORDER — ROSUVASTATIN CALCIUM 5 MG/1
5 TABLET, COATED ORAL DAILY
Qty: 30 TABLET | Refills: 5 | Status: SHIPPED | OUTPATIENT
Start: 2020-12-14 | End: 2020-12-14 | Stop reason: SDUPTHER

## 2020-12-14 RX ORDER — ROSUVASTATIN CALCIUM 5 MG/1
5 TABLET, COATED ORAL DAILY
Qty: 90 TABLET | Refills: 1 | Status: SHIPPED | OUTPATIENT
Start: 2020-12-14 | End: 2021-05-28

## 2020-12-28 ENCOUNTER — APPOINTMENT (EMERGENCY)
Dept: CT IMAGING | Facility: HOSPITAL | Age: 60
End: 2020-12-28
Payer: COMMERCIAL

## 2020-12-28 ENCOUNTER — APPOINTMENT (OUTPATIENT)
Dept: RADIOLOGY | Facility: CLINIC | Age: 60
End: 2020-12-28
Payer: COMMERCIAL

## 2020-12-28 ENCOUNTER — OFFICE VISIT (OUTPATIENT)
Dept: URGENT CARE | Facility: CLINIC | Age: 60
End: 2020-12-28
Payer: COMMERCIAL

## 2020-12-28 ENCOUNTER — HOSPITAL ENCOUNTER (EMERGENCY)
Facility: HOSPITAL | Age: 60
Discharge: HOME | End: 2020-12-28
Attending: EMERGENCY MEDICINE | Admitting: EMERGENCY MEDICINE
Payer: COMMERCIAL

## 2020-12-28 ENCOUNTER — TELEPHONE (OUTPATIENT)
Dept: URGENT CARE | Facility: CLINIC | Age: 60
End: 2020-12-28

## 2020-12-28 VITALS
TEMPERATURE: 96.7 F | HEIGHT: 70 IN | WEIGHT: 163 LBS | SYSTOLIC BLOOD PRESSURE: 157 MMHG | DIASTOLIC BLOOD PRESSURE: 78 MMHG | RESPIRATION RATE: 18 BRPM | BODY MASS INDEX: 23.34 KG/M2 | HEART RATE: 69 BPM | OXYGEN SATURATION: 100 %

## 2020-12-28 VITALS
OXYGEN SATURATION: 97 % | HEIGHT: 70 IN | WEIGHT: 163 LBS | TEMPERATURE: 98.2 F | RESPIRATION RATE: 18 BRPM | HEART RATE: 80 BPM | BODY MASS INDEX: 23.34 KG/M2 | SYSTOLIC BLOOD PRESSURE: 138 MMHG | DIASTOLIC BLOOD PRESSURE: 80 MMHG

## 2020-12-28 DIAGNOSIS — N20.0 KIDNEY STONE: Primary | ICD-10-CM

## 2020-12-28 DIAGNOSIS — R10.31 RIGHT LOWER QUADRANT ABDOMINAL PAIN: ICD-10-CM

## 2020-12-28 DIAGNOSIS — M54.9 BACK PAIN: Primary | ICD-10-CM

## 2020-12-28 LAB
ANION GAP SERPL CALCULATED.3IONS-SCNC: 3 MMOL/L (ref 4–13)
BASOPHILS # BLD AUTO: 0.1 THOUSANDS/ΜL (ref 0–0.1)
BASOPHILS NFR BLD AUTO: 1 % (ref 0–2)
BUN SERPL-MCNC: 16 MG/DL (ref 7–25)
CALCIUM SERPL-MCNC: 8.8 MG/DL (ref 8.6–10.5)
CHLORIDE SERPL-SCNC: 108 MMOL/L (ref 98–107)
CO2 SERPL-SCNC: 25 MMOL/L (ref 21–31)
CREAT SERPL-MCNC: 0.82 MG/DL (ref 0.7–1.3)
EOSINOPHIL # BLD AUTO: 0.1 THOUSAND/ΜL (ref 0–0.61)
EOSINOPHIL NFR BLD AUTO: 1 % (ref 0–5)
ERYTHROCYTE [DISTWIDTH] IN BLOOD BY AUTOMATED COUNT: 12.4 % (ref 11.5–14.5)
GFR SERPL CREATININE-BSD FRML MDRD: 97 ML/MIN/1.73SQ M
GLUCOSE SERPL-MCNC: 97 MG/DL (ref 65–99)
HCT VFR BLD AUTO: 39.7 % (ref 42–47)
HGB BLD-MCNC: 13.4 G/DL (ref 14–18)
LYMPHOCYTES # BLD AUTO: 2.2 THOUSANDS/ΜL (ref 0.6–4.47)
LYMPHOCYTES NFR BLD AUTO: 22 % (ref 21–51)
MCH RBC QN AUTO: 31.8 PG (ref 26–34)
MCHC RBC AUTO-ENTMCNC: 33.9 G/DL (ref 31–37)
MCV RBC AUTO: 94 FL (ref 81–99)
MONOCYTES # BLD AUTO: 0.5 THOUSAND/ΜL (ref 0.17–1.22)
MONOCYTES NFR BLD AUTO: 6 % (ref 2–12)
NEUTROPHILS # BLD AUTO: 7 THOUSANDS/ΜL (ref 1.4–6.5)
NEUTS SEG NFR BLD AUTO: 71 % (ref 42–75)
PLATELET # BLD AUTO: 288 THOUSANDS/UL (ref 149–390)
PMV BLD AUTO: 6.5 FL (ref 8.6–11.7)
POTASSIUM SERPL-SCNC: 3.9 MMOL/L (ref 3.5–5.5)
RBC # BLD AUTO: 4.22 MILLION/UL (ref 4.3–5.9)
SL AMB  POCT GLUCOSE, UA: NEGATIVE
SL AMB LEUKOCYTE ESTERASE,UA: NEGATIVE
SL AMB POCT BILIRUBIN,UA: NEGATIVE
SL AMB POCT BLOOD,UA: ABNORMAL
SL AMB POCT CLARITY,UA: ABNORMAL
SL AMB POCT COLOR,UA: ABNORMAL
SL AMB POCT KETONES,UA: NEGATIVE
SL AMB POCT NITRITE,UA: NEGATIVE
SL AMB POCT PH,UA: 5
SL AMB POCT SPECIFIC GRAVITY,UA: 1.02
SL AMB POCT URINE PROTEIN: NEGATIVE
SL AMB POCT UROBILINOGEN: 0.2
SODIUM SERPL-SCNC: 136 MMOL/L (ref 134–143)
WBC # BLD AUTO: 9.9 THOUSAND/UL (ref 4.8–10.8)

## 2020-12-28 PROCEDURE — 85025 COMPLETE CBC W/AUTO DIFF WBC: CPT | Performed by: EMERGENCY MEDICINE

## 2020-12-28 PROCEDURE — 36415 COLL VENOUS BLD VENIPUNCTURE: CPT | Performed by: EMERGENCY MEDICINE

## 2020-12-28 PROCEDURE — 87086 URINE CULTURE/COLONY COUNT: CPT | Performed by: PHYSICIAN ASSISTANT

## 2020-12-28 PROCEDURE — 99284 EMERGENCY DEPT VISIT MOD MDM: CPT

## 2020-12-28 PROCEDURE — 80048 BASIC METABOLIC PNL TOTAL CA: CPT | Performed by: EMERGENCY MEDICINE

## 2020-12-28 PROCEDURE — G1004 CDSM NDSC: HCPCS

## 2020-12-28 PROCEDURE — 99283 EMERGENCY DEPT VISIT LOW MDM: CPT | Performed by: EMERGENCY MEDICINE

## 2020-12-28 PROCEDURE — 74176 CT ABD & PELVIS W/O CONTRAST: CPT

## 2020-12-28 PROCEDURE — G0382 LEV 3 HOSP TYPE B ED VISIT: HCPCS | Performed by: PHYSICIAN ASSISTANT

## 2020-12-28 PROCEDURE — 81002 URINALYSIS NONAUTO W/O SCOPE: CPT | Performed by: PHYSICIAN ASSISTANT

## 2020-12-28 PROCEDURE — 74018 RADEX ABDOMEN 1 VIEW: CPT

## 2020-12-28 RX ORDER — OXYCODONE HYDROCHLORIDE 5 MG/1
5 TABLET ORAL EVERY 4 HOURS PRN
Qty: 12 TABLET | Refills: 0 | Status: SHIPPED | OUTPATIENT
Start: 2020-12-28 | End: 2020-12-30

## 2020-12-28 RX ORDER — KETOROLAC TROMETHAMINE 30 MG/ML
30 INJECTION, SOLUTION INTRAMUSCULAR; INTRAVENOUS ONCE
Status: COMPLETED | OUTPATIENT
Start: 2020-12-28 | End: 2020-12-28

## 2020-12-28 RX ORDER — IBUPROFEN 600 MG/1
600 TABLET ORAL EVERY 8 HOURS PRN
Qty: 30 TABLET | Refills: 0 | Status: SHIPPED | OUTPATIENT
Start: 2020-12-28 | End: 2021-08-09

## 2020-12-28 RX ORDER — TAMSULOSIN HYDROCHLORIDE 0.4 MG/1
0.4 CAPSULE ORAL
Qty: 7 CAPSULE | Refills: 0 | Status: SHIPPED | OUTPATIENT
Start: 2020-12-28 | End: 2021-05-28

## 2020-12-28 RX ADMIN — KETOROLAC TROMETHAMINE 30 MG: 30 INJECTION, SOLUTION INTRAMUSCULAR; INTRAVENOUS at 15:00

## 2020-12-29 LAB — BACTERIA UR CULT: NORMAL

## 2021-04-10 ENCOUNTER — APPOINTMENT (OUTPATIENT)
Dept: LAB | Facility: CLINIC | Age: 61
End: 2021-04-10
Payer: COMMERCIAL

## 2021-04-10 DIAGNOSIS — E78.00 HYPERCHOLESTEROLEMIA: ICD-10-CM

## 2021-04-10 LAB
ALBUMIN SERPL BCP-MCNC: 3.7 G/DL (ref 3.5–5)
ALP SERPL-CCNC: 70 U/L (ref 46–116)
ALT SERPL W P-5'-P-CCNC: 29 U/L (ref 12–78)
ANION GAP SERPL CALCULATED.3IONS-SCNC: 4 MMOL/L (ref 4–13)
AST SERPL W P-5'-P-CCNC: 15 U/L (ref 5–45)
BILIRUB SERPL-MCNC: 0.39 MG/DL (ref 0.2–1)
BUN SERPL-MCNC: 13 MG/DL (ref 5–25)
CALCIUM SERPL-MCNC: 8.6 MG/DL (ref 8.3–10.1)
CHLORIDE SERPL-SCNC: 112 MMOL/L (ref 100–108)
CHOLEST SERPL-MCNC: 251 MG/DL (ref 50–200)
CO2 SERPL-SCNC: 27 MMOL/L (ref 21–32)
CREAT SERPL-MCNC: 0.91 MG/DL (ref 0.6–1.3)
GFR SERPL CREATININE-BSD FRML MDRD: 93 ML/MIN/1.73SQ M
GLUCOSE P FAST SERPL-MCNC: 112 MG/DL (ref 65–99)
HDLC SERPL-MCNC: 47 MG/DL
LDLC SERPL CALC-MCNC: 186 MG/DL (ref 0–100)
NONHDLC SERPL-MCNC: 204 MG/DL
POTASSIUM SERPL-SCNC: 4.1 MMOL/L (ref 3.5–5.3)
PROT SERPL-MCNC: 7.6 G/DL (ref 6.4–8.2)
SODIUM SERPL-SCNC: 143 MMOL/L (ref 136–145)
TRIGL SERPL-MCNC: 91 MG/DL

## 2021-04-10 PROCEDURE — 80053 COMPREHEN METABOLIC PANEL: CPT

## 2021-04-10 PROCEDURE — 36415 COLL VENOUS BLD VENIPUNCTURE: CPT

## 2021-04-10 PROCEDURE — 80061 LIPID PANEL: CPT

## 2021-04-13 ENCOUNTER — OFFICE VISIT (OUTPATIENT)
Dept: FAMILY MEDICINE CLINIC | Facility: CLINIC | Age: 61
End: 2021-04-13
Payer: COMMERCIAL

## 2021-04-13 VITALS
HEIGHT: 70 IN | BODY MASS INDEX: 25.11 KG/M2 | RESPIRATION RATE: 20 BRPM | HEART RATE: 78 BPM | SYSTOLIC BLOOD PRESSURE: 128 MMHG | WEIGHT: 175.4 LBS | DIASTOLIC BLOOD PRESSURE: 82 MMHG | OXYGEN SATURATION: 99 % | TEMPERATURE: 97.4 F

## 2021-04-13 DIAGNOSIS — Z78.9 STATIN INTOLERANCE: ICD-10-CM

## 2021-04-13 DIAGNOSIS — R73.9 ELEVATED BLOOD SUGAR: ICD-10-CM

## 2021-04-13 DIAGNOSIS — E78.00 HYPERCHOLESTEROLEMIA: Primary | ICD-10-CM

## 2021-04-13 PROCEDURE — 99214 OFFICE O/P EST MOD 30 MIN: CPT | Performed by: NURSE PRACTITIONER

## 2021-04-13 RX ORDER — EZETIMIBE 10 MG/1
10 TABLET ORAL DAILY
Qty: 30 TABLET | Refills: 3 | Status: SHIPPED | OUTPATIENT
Start: 2021-04-13 | End: 2021-04-13 | Stop reason: SDUPTHER

## 2021-04-13 RX ORDER — EZETIMIBE 10 MG/1
10 TABLET ORAL DAILY
Qty: 90 TABLET | Refills: 1 | Status: SHIPPED | OUTPATIENT
Start: 2021-04-13 | End: 2021-08-09 | Stop reason: SDUPTHER

## 2021-04-13 NOTE — PROGRESS NOTES
[facility]        NAME: Erika Renner is a 64 y o  male  : 1960    MRN: [id number]  DATE: 2021  TIME: 7:47 AM    Assessment and Plan   Hypercholesterolemia [E78 00]  1  Hypercholesterolemia  Lipid Panel with Direct LDL reflex    ezetimibe (ZETIA) 10 mg tablet    DISCONTINUED: ezetimibe (ZETIA) 10 mg tablet   2  Elevated blood sugar  HEMOGLOBIN A1C W/ EAG ESTIMATION    Comprehensive metabolic panel   3  Statin intolerance       1  Hypercholesterolemia  Cholesterol remains high, eats a lot of red meat  Will try to change diet  Start zetia  Recheck labs in 4 months  - Lipid Panel with Direct LDL reflex; Future  - ezetimibe (ZETIA) 10 mg tablet; Take 1 tablet (10 mg total) by mouth daily  Dispense: 90 tablet; Refill: 1    2  Elevated blood sugar  Continue to monitor  Mother was borderline DM/pre diabetic  Last a1c was 5 5  - HEMOGLOBIN A1C W/ EAG ESTIMATION; Future  - Comprehensive metabolic panel; Future    3  Statin intolerance  Unable to tolerate crestor or lipitor  BMI Counseling: Body mass index is 25 17 kg/m²  The BMI is above normal  Nutrition recommendations include encouraging healthy choices of fruits and vegetables, consuming healthier snacks, limiting drinks that contain sugar, moderation in carbohydrate intake, reducing intake of saturated and trans fat and reducing intake of cholesterol  Exercise recommendations include exercising 3-5 times per week  Patient Instructions     There are no Patient Instructions on file for this visit  Chief Complaint     Chief Complaint   Patient presents with    Follow-up     hld; pt states he is not going to continue the cholesterol medication as it made both of his legs hurt         History of Present Illness       Patient here for follow up visit for cholesterol  Patient admits to eating a lot of red meat at least 3 times a week,  Does eat chicken and fish  Eat all different kinds of breads   Eats a lot of carbs and sweets  BP is good today  Cholesterol levels remain elevated, patient was not able to tolerate the statin medication and had severe leg cramps  Willing to try another medication  Fasting sugar is borderline, last HgbA1c was 5 5, will continue to monitor this  Review of Systems   Review of Systems   Constitutional: Negative for activity change, appetite change, chills, fatigue and fever  HENT: Negative for congestion, ear pain, nosebleeds, rhinorrhea and sore throat  Eyes: Negative for photophobia, pain, redness and visual disturbance  Respiratory: Negative for cough, shortness of breath and wheezing  Cardiovascular: Negative  Negative for chest pain  Gastrointestinal: Negative  Negative for abdominal pain, constipation, diarrhea and vomiting  Endocrine: Negative  Genitourinary: Negative for difficulty urinating, dysuria and flank pain  Musculoskeletal: Negative  Skin: Negative for color change and rash  Neurological: Negative for dizziness, weakness, numbness and headaches  Hematological: Negative for adenopathy  Psychiatric/Behavioral: Negative for agitation and confusion  The patient is not nervous/anxious          PHQ-9 Depression Screening    PHQ-9:   Frequency of the following problems over the past two weeks:      Little interest or pleasure in doing things: 0 - not at all  Feeling down, depressed, or hopeless: 0 - not at all  PHQ-2 Score: 0        Current Medications       Current Outpatient Medications:     aspirin 81 MG tablet, Take 1 tablet (81 mg total) by mouth daily, Disp: , Rfl:     ibuprofen (MOTRIN) 600 mg tablet, Take 1 tablet (600 mg total) by mouth every 8 (eight) hours as needed for moderate pain, Disp: 30 tablet, Rfl: 0    ezetimibe (ZETIA) 10 mg tablet, Take 1 tablet (10 mg total) by mouth daily, Disp: 90 tablet, Rfl: 1    rosuvastatin (CRESTOR) 5 mg tablet, Take 1 tablet (5 mg total) by mouth daily (Patient not taking: Reported on 4/13/2021), Disp: 90 tablet, Rfl: 1    tamsulosin (FLOMAX) 0 4 mg, Take 1 capsule (0 4 mg total) by mouth daily with dinner for 7 days (Patient not taking: Reported on 4/13/2021), Disp: 7 capsule, Rfl: 0    Current Allergies     Allergies as of 04/13/2021 - Reviewed 04/13/2021   Allergen Reaction Noted    Lipitor [atorvastatin]  10/30/2019            The following portions of the patient's history were reviewed and updated as appropriate: allergies, current medications, past family history, past medical history, past social history, past surgical history and problem list      Past Medical History:   Diagnosis Date    Arthritis     Herniated lumbar intervertebral disc     Hyperlipidemia     Lumbar radiculopathy        Past Surgical History:   Procedure Laterality Date    HERNIA REPAIR      KNEE SURGERY      LUMBAR LAMINECTOMY N/A 10/23/2018    Procedure: MIS/[employer]Rx left L5-S1 hemiLAMINECTOMY, medial facetectmoy, & microdiscectomy;  Surgeon: Chaitanya Chamberlain MD;  Location: [facility];  Service: Neurosurgery       Family History   Problem Relation Age of Onset    Pancreatic cancer Mother          Medications have been verified  Objective   /82   Pulse 78   Temp (!) 97 4 °F (36 3 °C)   Resp 20   Ht 5' 10" (1 778 m)   Wt 79 6 kg (175 lb 6 4 oz)   SpO2 99%   BMI 25 17 kg/m²        Physical Exam     Physical Exam  Vitals signs and nursing note reviewed  Constitutional:       General: He is not in acute distress  Appearance: Normal appearance  He is well-developed  He is not ill-appearing  Eyes:      General: Lids are normal    Cardiovascular:      Rate and Rhythm: Normal rate and regular rhythm  Heart sounds: Normal heart sounds, S1 normal and S2 normal    Pulmonary:      Effort: Pulmonary effort is normal  No respiratory distress  Breath sounds: Normal breath sounds  No decreased breath sounds or wheezing  Musculoskeletal: Normal range of motion           General: No tenderness or deformity  Skin:     General: Skin is warm  Findings: No erythema or rash  Neurological:      Mental Status: He is alert and oriented to person, place, and time  Psychiatric:         Behavior: Behavior normal  Behavior is cooperative  Thought Content:  Thought content normal

## 2021-05-28 ENCOUNTER — OFFICE VISIT (OUTPATIENT)
Dept: FAMILY MEDICINE CLINIC | Facility: CLINIC | Age: 61
End: 2021-05-28
Payer: COMMERCIAL

## 2021-05-28 VITALS
RESPIRATION RATE: 20 BRPM | HEART RATE: 83 BPM | BODY MASS INDEX: 24.57 KG/M2 | WEIGHT: 171.6 LBS | OXYGEN SATURATION: 98 % | HEIGHT: 70 IN | SYSTOLIC BLOOD PRESSURE: 130 MMHG | TEMPERATURE: 97.8 F | DIASTOLIC BLOOD PRESSURE: 86 MMHG

## 2021-05-28 DIAGNOSIS — E78.00 HYPERCHOLESTEROLEMIA: ICD-10-CM

## 2021-05-28 DIAGNOSIS — Z00.00 ANNUAL PHYSICAL EXAM: Primary | ICD-10-CM

## 2021-05-28 DIAGNOSIS — Z78.9 STATIN INTOLERANCE: ICD-10-CM

## 2021-05-28 PROBLEM — Z87.891 HISTORY OF SMOKING 10-25 PACK YEARS: Status: ACTIVE | Noted: 2019-01-29

## 2021-05-28 PROCEDURE — 99396 PREV VISIT EST AGE 40-64: CPT | Performed by: NURSE PRACTITIONER

## 2021-05-28 NOTE — PATIENT INSTRUCTIONS

## 2021-05-28 NOTE — PROGRESS NOTES
ADULT ANNUAL [facility]    NAME: Sarah Beth Erwin  AGE: 64 y o  SEX: male  : 1960     DATE: 2021     Assessment and Plan:     Problem List Items Addressed This Visit        Other    Hypercholesterolemia    Statin intolerance      Other Visit Diagnoses     Annual physical exam    -  Primary        1  Annual physical exam  Has been more active  Trying to decrease red meats  2  Hypercholesterolemia  Zetia tolerating  Will check cholesterol before next visit  3  Statin intolerance  Muscle cramps with statins    Immunizations and preventive care screenings were discussed with patient today  Appropriate education was printed on patient's after visit summary  Counseling:  Alcohol/drug use: discussed moderation in alcohol intake, the recommendations for healthy alcohol use, and avoidance of illicit drug use  Dental Health: discussed importance of regular tooth brushing, flossing, and dental visits  Sexual health: discussed sexually transmitted diseases, partner selection, use of condoms, avoidance of unintended pregnancy, and contraceptive alternatives  · Exercise: the importance of regular exercise/physical activity was discussed  Recommend exercise 3-5 times per week for at least 30 minutes  Return in 3 months (on 2021)  Chief Complaint:     Chief Complaint   Patient presents with    Physical Exam      History of Present Illness:     Adult Annual Physical   Patient here for a comprehensive physical exam  The patient reports no problems  Diet and Physical Activity  · Diet/Nutrition: well balanced diet, limited junk food and consuming 3-5 servings of fruits/vegetables daily  · Exercise: physical activity at work, active lifestyle         Depression Screening  PHQ-9 Depression Screening    PHQ-9:   Frequency of the following problems over the past two weeks:      Little interest or pleasure in doing things: 0 - not at all  Feeling down, depressed, or hopeless: 0 - not at all  PHQ-2 Score: 0       General Health  · Sleep: sleeps well and gets 4-6 hours of sleep on average  · Hearing: decreased - right  Trula Aquas on right ear  · Vision: goes for regular eye exams, most recent eye exam <1 year ago and wears glasses  · Dental: no dental visits for >1 year, does not brush teeth regularly and does not floss   Health  · Symptoms include: none     Review of Systems:     Review of Systems   Constitutional: Negative for activity change, appetite change, chills, fatigue and fever  HENT: Negative for congestion, ear pain, nosebleeds, rhinorrhea and sore throat  Eyes: Negative for photophobia, pain, redness and visual disturbance  Respiratory: Negative for cough, shortness of breath and wheezing  Cardiovascular: Negative  Negative for chest pain  Gastrointestinal: Negative  Negative for abdominal pain, constipation, diarrhea and vomiting  Endocrine: Negative  Genitourinary: Negative for difficulty urinating, dysuria and flank pain  Musculoskeletal: Negative  Skin: Negative for color change and rash  Neurological: Negative for dizziness, weakness, numbness and headaches  Hematological: Negative for adenopathy  Psychiatric/Behavioral: Negative for agitation and confusion  The patient is not nervous/anxious         Past Medical History:     Past Medical History:   Diagnosis Date    Arthritis     Herniated lumbar intervertebral disc     Hyperlipidemia     Lumbar radiculopathy       Past Surgical History:     Past Surgical History:   Procedure Laterality Date    HERNIA REPAIR      KNEE SURGERY      LUMBAR LAMINECTOMY N/A 10/23/2018    Procedure: MIS/[facility] left L5-S1 hemiLAMINECTOMY, medial facetectmoy, & microdiscectomy;  Surgeon: Chelsey Jain MD;  Location: [facility];  Service: Neurosurgery      Family History:     Family History   Problem Relation Age of Onset    Pancreatic cancer Mother       Social History:     E-Cigarette/Vaping    E-Cigarette Use Never User      E-Cigarette/Vaping Substances    Nicotine No     THC No     CBD No     Flavoring No     Other No     Unknown No      Social History     Socioeconomic History    Marital status: /Civil Union     Spouse name: Dorcas Lora Number of children: 3    Years of education: 15    Highest education level: None   Occupational History    Occupation: Contractor   Social Needs    Financial resource strain: None    Food insecurity     Worry: None     Inability: None    Transportation needs     Medical: None     Non-medical: None   Tobacco Use    Smoking status: Former Smoker     Packs/day: 0 50     Years: 40 00     Pack years: 20 00     Quit date: 12/2017     Years since quitting: 3 4    Smokeless tobacco: Never Used   Substance and Sexual Activity    Alcohol use: No    Drug use: Yes     Types: Marijuana    Sexual activity: Yes   Lifestyle    Physical activity     Days per week: None     Minutes per session: None    Stress: None   Relationships    Social connections     Talks on phone: None     Gets together: None     Attends Orthodoxy service: None     Active member of club or organization: None     Attends meetings of clubs or organizations: None     Relationship status: None    Intimate partner violence     Fear of current or ex partner: None     Emotionally abused: None     Physically abused: None     Forced sexual activity: None   Other Topics Concern    None   Social History Narrative    Lives at home with spouse and 2 grandkids      Current Medications:     Current Outpatient Medications   Medication Sig Dispense Refill    aspirin 81 MG tablet Take 1 tablet (81 mg total) by mouth daily      ibuprofen (MOTRIN) 600 mg tablet Take 1 tablet (600 mg total) by mouth every 8 (eight) hours as needed for moderate pain 30 tablet 0    ezetimibe (ZETIA) 10 mg tablet Take 1 tablet (10 mg total) by mouth daily 90 tablet 1     No current facility-administered medications for this visit  Allergies: Allergies   Allergen Reactions    Lipitor [Atorvastatin]      Leg Cramping  Physical Exam:     /86   Pulse 83   Temp 97 8 °F (36 6 °C) (Tympanic)   Resp 20   Ht 5' 10" (1 778 m)   Wt 77 8 kg (171 lb 9 6 oz)   SpO2 98%   BMI 24 62 kg/m²     Physical Exam  Vitals signs and nursing note reviewed  Constitutional:       General: He is not in acute distress  Appearance: He is well-developed  He is not diaphoretic  Cardiovascular:      Rate and Rhythm: Normal rate and regular rhythm  Heart sounds: Normal heart sounds  Pulmonary:      Effort: Pulmonary effort is normal  No respiratory distress  Breath sounds: Normal breath sounds  No wheezing or rales  Abdominal:      General: Bowel sounds are normal  There is no distension  Palpations: Abdomen is soft  Tenderness: There is no abdominal tenderness  Hernia: No hernia is present  Musculoskeletal: Normal range of motion  Skin:     General: Skin is warm and dry  Capillary Refill: Capillary refill takes less than 2 seconds  Findings: No erythema or rash  Neurological:      Mental Status: He is alert  Psychiatric:         Behavior: Behavior normal  Behavior is cooperative  Thought Content:  Thought content normal           DEVYN Che  [facility]

## 2021-07-01 ENCOUNTER — OFFICE VISIT (OUTPATIENT)
Dept: URGENT CARE | Facility: CLINIC | Age: 61
End: 2021-07-01
Payer: COMMERCIAL

## 2021-07-01 ENCOUNTER — TELEPHONE (OUTPATIENT)
Dept: URGENT CARE | Facility: CLINIC | Age: 61
End: 2021-07-01

## 2021-07-01 VITALS
HEART RATE: 67 BPM | DIASTOLIC BLOOD PRESSURE: 78 MMHG | TEMPERATURE: 97 F | OXYGEN SATURATION: 99 % | SYSTOLIC BLOOD PRESSURE: 129 MMHG | RESPIRATION RATE: 18 BRPM

## 2021-07-01 DIAGNOSIS — R31.9 HEMATURIA, UNSPECIFIED TYPE: Primary | ICD-10-CM

## 2021-07-01 LAB
SL AMB  POCT GLUCOSE, UA: ABNORMAL
SL AMB LEUKOCYTE ESTERASE,UA: ABNORMAL
SL AMB POCT BILIRUBIN,UA: ABNORMAL
SL AMB POCT BLOOD,UA: ABNORMAL
SL AMB POCT CLARITY,UA: ABNORMAL
SL AMB POCT COLOR,UA: ABNORMAL
SL AMB POCT KETONES,UA: ABNORMAL
SL AMB POCT NITRITE,UA: ABNORMAL
SL AMB POCT PH,UA: 6
SL AMB POCT SPECIFIC GRAVITY,UA: 1.02
SL AMB POCT URINE PROTEIN: ABNORMAL
SL AMB POCT UROBILINOGEN: 0.2

## 2021-07-01 PROCEDURE — G0382 LEV 3 HOSP TYPE B ED VISIT: HCPCS | Performed by: NURSE PRACTITIONER

## 2021-07-01 PROCEDURE — 81002 URINALYSIS NONAUTO W/O SCOPE: CPT | Performed by: NURSE PRACTITIONER

## 2021-07-01 NOTE — PROGRESS NOTES
[facility]        NAME: Ramon Chao is a 64 y o  male  : 1960    MRN: [id number]  DATE: 2021  TIME: 9:27 AM    Assessment and Plan   Hematuria, unspecified type [R31 9]  1  Hematuria, unspecified type  POCT urine dip    XR abdomen 1 view kub         Patient Instructions     Patient Instructions     There is likely a kidney stone on left side  Will call you if final read is different  Increase oral fluids  Motrin as needed for discomfort  Follow up with PCP  Go to ER with any worsening symptoms, pain, nausea, vomiting or fever  Kidney Stones   WHAT YOU NEED TO KNOW:   Kidney stones form in the urinary system when the water and waste in your urine are out of balance  When this happens, certain types of waste crystals separate from the urine  The crystals build up and form kidney stones  You may have more than one kidney stone  DISCHARGE INSTRUCTIONS:   Return to the emergency department if:   · You have vomiting that is not relieved by medicine  Contact your healthcare provider if:   · You have a fever  · You have trouble passing urine  · You see blood in your urine  · You have severe pain  · You have any questions or concerns about your condition or care  Medicines:   · NSAIDs , such as ibuprofen, help decrease swelling, pain, and fever  This medicine is available with or without a doctor's order  NSAIDs can cause stomach bleeding or kidney problems in certain people  If you take blood thinner medicine, always ask your healthcare provider if NSAIDs are safe for you  Always read the medicine label and follow directions  · Prescription pain medicine  may be given  Ask your healthcare provider how to take this medicine safely  Some prescription pain medicines contain acetaminophen  Do not take other medicines that contain acetaminophen without talking to your healthcare provider  Too much acetaminophen may cause liver damage   Prescription pain medicine may cause constipation  Ask your healthcare provider how to prevent or treat constipation  · Medicines  to balance your electrolytes may be needed  · Take your medicine as directed  Contact your healthcare provider if you think your medicine is not helping or if you have side effects  Tell him or her if you are allergic to any medicine  Keep a list of the medicines, vitamins, and herbs you take  Include the amounts, and when and why you take them  Bring the list or the pill bottles to follow-up visits  Carry your medicine list with you in case of an emergency  Follow up with your healthcare provider as directed: You may need to return for more tests  Write down your questions so you remember to ask them during your visits  What you can do to manage kidney stones:   · Drink more liquids  Your healthcare provider may tell you to drink at least 8 to 12 (eight-ounce) cups of liquids each day  This helps flush out the kidney stones when you urinate  Water is the best liquid to drink  · Strain your urine every time you go to the bathroom  Urinate through a strainer or a piece of thin cloth to catch the stones  Take the stones to your healthcare provider so they can be sent to the lab for tests  This will help your healthcare providers plan the best treatment for you  · Eat a variety of healthy foods  Healthy foods include fruits, vegetables, whole-grain breads, low-fat dairy products, beans, and fish  You may need to limit how much sodium (salt) or protein you eat  Ask for information about the best foods for you  · Stay active  Your stones may pass more easily if you stay active  Exercise can also help you manage your weight  Ask about the best activities for you  After you pass the kidney stones: Your healthcare provider may  order a 24-hour urine test  Results from a 24-hour urine test will help your healthcare provider plan ways to prevent more stones from forming   Your healthcare provider will give you more instructions  © Copyright [address] Information is for End User's use only and may not be sold, redistributed or otherwise used for commercial purposes  All illustrations and images included in CareNotes® are the copyrighted property of A D [employer] M [employer]  or Brett Salgado   The above information is an  only  It is not intended as medical advice for individual conditions or treatments  Talk to your doctor, nurse or pharmacist before following any medical regimen to see if it is safe and effective for you  Chief Complaint     Chief Complaint   Patient presents with    Abdominal Pain     X 2 days         History of Present Illness   Blair Reach presents to the clinic c/o    This is a 64year old male here today with complaints of blood in urine  He states he has noticed this the last 2 days  He denies any abd pain but does note he has had 6-7 episodes of left sided abd since Dec   He does have history of kidney stones  No nausea, vomiting, no diarrhea  No urinary urgency, frequency or burning  He states he noticed dark urine in afternoon and then it clears  It is normal in the AM        Review of Systems   Review of Systems   Constitutional: Negative for activity change, chills, fatigue and fever  Gastrointestinal: Negative for abdominal pain, constipation, nausea and vomiting  Genitourinary: Positive for hematuria  Negative for dysuria, flank pain, frequency and urgency  Skin: Negative  Neurological: Negative  Psychiatric/Behavioral: Negative            Current Medications     Long-Term Medications   Medication Sig Dispense Refill    ezetimibe (ZETIA) 10 mg tablet Take 1 tablet (10 mg total) by mouth daily 90 tablet 1    aspirin 81 MG tablet Take 1 tablet (81 mg total) by mouth daily (Patient not taking: Reported on 7/1/2021)      ibuprofen (MOTRIN) 600 mg tablet Take 1 tablet (600 mg total) by mouth every 8 (eight) hours as needed for moderate pain (Patient not taking: Reported on 7/1/2021) 30 tablet 0       Current Allergies     Allergies as of 07/01/2021 - Reviewed 07/01/2021   Allergen Reaction Noted    Lipitor [atorvastatin]  10/30/2019            The following portions of the patient's history were reviewed and updated as appropriate: allergies, current medications, past family history, past medical history, past social history, past surgical history and problem list     Objective   /78   Pulse 67   Temp (!) 97 °F (36 1 °C) (Temporal)   Resp 18   SpO2 99%        Physical Exam     Physical Exam  Vitals and nursing note reviewed  Constitutional:       General: He is not in acute distress  Appearance: He is well-developed  He is not ill-appearing or toxic-appearing  Cardiovascular:      Rate and Rhythm: Normal rate and regular rhythm  Pulmonary:      Effort: Pulmonary effort is normal       Breath sounds: Normal breath sounds  Abdominal:      General: Abdomen is flat  Bowel sounds are normal       Tenderness: There is no abdominal tenderness  Neurological:      Mental Status: He is alert and oriented to person, place, and time     Psychiatric:         Mood and Affect: Mood normal          Behavior: Behavior normal

## 2021-07-01 NOTE — TELEPHONE ENCOUNTER
Attempted to call patient with xray report  No answer and unable to leave message, will attempt to call again later  Patient was aware that this is most likely a kidney stone and recommend he follow up with PCP and go to ER with worsening symptoms

## 2021-07-01 NOTE — PATIENT INSTRUCTIONS
There is likely a kidney stone on left side  Will call you if final read is different  Increase oral fluids  Motrin as needed for discomfort  Follow up with PCP  Go to ER with any worsening symptoms, pain, nausea, vomiting or fever  Kidney Stones   WHAT YOU NEED TO KNOW:   Kidney stones form in the urinary system when the water and waste in your urine are out of balance  When this happens, certain types of waste crystals separate from the urine  The crystals build up and form kidney stones  You may have more than one kidney stone  DISCHARGE INSTRUCTIONS:   Return to the emergency department if:   · You have vomiting that is not relieved by medicine  Contact your healthcare provider if:   · You have a fever  · You have trouble passing urine  · You see blood in your urine  · You have severe pain  · You have any questions or concerns about your condition or care  Medicines:   · NSAIDs , such as ibuprofen, help decrease swelling, pain, and fever  This medicine is available with or without a doctor's order  NSAIDs can cause stomach bleeding or kidney problems in certain people  If you take blood thinner medicine, always ask your healthcare provider if NSAIDs are safe for you  Always read the medicine label and follow directions  · Prescription pain medicine  may be given  Ask your healthcare provider how to take this medicine safely  Some prescription pain medicines contain acetaminophen  Do not take other medicines that contain acetaminophen without talking to your healthcare provider  Too much acetaminophen may cause liver damage  Prescription pain medicine may cause constipation  Ask your healthcare provider how to prevent or treat constipation  · Medicines  to balance your electrolytes may be needed  · Take your medicine as directed  Contact your healthcare provider if you think your medicine is not helping or if you have side effects   Tell him or her if you are allergic to any medicine  Keep a list of the medicines, vitamins, and herbs you take  Include the amounts, and when and why you take them  Bring the list or the pill bottles to follow-up visits  Carry your medicine list with you in case of an emergency  Follow up with your healthcare provider as directed: You may need to return for more tests  Write down your questions so you remember to ask them during your visits  What you can do to manage kidney stones:   · Drink more liquids  Your healthcare provider may tell you to drink at least 8 to 12 (eight-ounce) cups of liquids each day  This helps flush out the kidney stones when you urinate  Water is the best liquid to drink  · Strain your urine every time you go to the bathroom  Urinate through a strainer or a piece of thin cloth to catch the stones  Take the stones to your healthcare provider so they can be sent to the lab for tests  This will help your healthcare providers plan the best treatment for you  · Eat a variety of healthy foods  Healthy foods include fruits, vegetables, whole-grain breads, low-fat dairy products, beans, and fish  You may need to limit how much sodium (salt) or protein you eat  Ask for information about the best foods for you  · Stay active  Your stones may pass more easily if you stay active  Exercise can also help you manage your weight  Ask about the best activities for you  After you pass the kidney stones: Your healthcare provider may  order a 24-hour urine test  Results from a 24-hour urine test will help your healthcare provider plan ways to prevent more stones from forming  Your healthcare provider will give you more instructions  © Copyright [address] Information is for End User's use only and may not be sold, redistributed or otherwise used for commercial purposes   All illustrations and images included in CareNotes® are the copyrighted property of [employer] D [employer] M [employer]  or Brett Ludwig  The above information is an  only  It is not intended as medical advice for individual conditions or treatments  Talk to your doctor, nurse or pharmacist before following any medical regimen to see if it is safe and effective for you

## 2021-08-05 ENCOUNTER — APPOINTMENT (OUTPATIENT)
Dept: LAB | Facility: CLINIC | Age: 61
End: 2021-08-05
Payer: COMMERCIAL

## 2021-08-05 DIAGNOSIS — E78.00 HYPERCHOLESTEROLEMIA: ICD-10-CM

## 2021-08-05 DIAGNOSIS — R73.9 ELEVATED BLOOD SUGAR: ICD-10-CM

## 2021-08-05 LAB
ALBUMIN SERPL BCP-MCNC: 3.7 G/DL (ref 3.5–5)
ALP SERPL-CCNC: 65 U/L (ref 46–116)
ALT SERPL W P-5'-P-CCNC: 52 U/L (ref 12–78)
ANION GAP SERPL CALCULATED.3IONS-SCNC: 5 MMOL/L (ref 4–13)
AST SERPL W P-5'-P-CCNC: 26 U/L (ref 5–45)
BILIRUB SERPL-MCNC: 0.51 MG/DL (ref 0.2–1)
BUN SERPL-MCNC: 13 MG/DL (ref 5–25)
CALCIUM SERPL-MCNC: 9.1 MG/DL (ref 8.3–10.1)
CHLORIDE SERPL-SCNC: 107 MMOL/L (ref 100–108)
CHOLEST SERPL-MCNC: 250 MG/DL (ref 50–200)
CO2 SERPL-SCNC: 27 MMOL/L (ref 21–32)
CREAT SERPL-MCNC: 0.86 MG/DL (ref 0.6–1.3)
EST. AVERAGE GLUCOSE BLD GHB EST-MCNC: 111 MG/DL
GFR SERPL CREATININE-BSD FRML MDRD: 96 ML/MIN/1.73SQ M
GLUCOSE P FAST SERPL-MCNC: 106 MG/DL (ref 65–99)
HBA1C MFR BLD: 5.5 %
HDLC SERPL-MCNC: 44 MG/DL
LDLC SERPL CALC-MCNC: 178 MG/DL (ref 0–100)
POTASSIUM SERPL-SCNC: 4.1 MMOL/L (ref 3.5–5.3)
PROT SERPL-MCNC: 7.7 G/DL (ref 6.4–8.2)
SODIUM SERPL-SCNC: 139 MMOL/L (ref 136–145)
TRIGL SERPL-MCNC: 141 MG/DL

## 2021-08-05 PROCEDURE — 80061 LIPID PANEL: CPT

## 2021-08-05 PROCEDURE — 36415 COLL VENOUS BLD VENIPUNCTURE: CPT

## 2021-08-05 PROCEDURE — 83036 HEMOGLOBIN GLYCOSYLATED A1C: CPT

## 2021-08-05 PROCEDURE — 80053 COMPREHEN METABOLIC PANEL: CPT

## 2021-08-09 ENCOUNTER — OFFICE VISIT (OUTPATIENT)
Dept: FAMILY MEDICINE CLINIC | Facility: CLINIC | Age: 61
End: 2021-08-09
Payer: COMMERCIAL

## 2021-08-09 VITALS
TEMPERATURE: 97.9 F | OXYGEN SATURATION: 98 % | HEART RATE: 80 BPM | RESPIRATION RATE: 20 BRPM | HEIGHT: 70 IN | BODY MASS INDEX: 25.28 KG/M2 | SYSTOLIC BLOOD PRESSURE: 110 MMHG | WEIGHT: 176.6 LBS | DIASTOLIC BLOOD PRESSURE: 78 MMHG

## 2021-08-09 DIAGNOSIS — E78.00 HYPERCHOLESTEROLEMIA: ICD-10-CM

## 2021-08-09 DIAGNOSIS — Z78.9 STATIN INTOLERANCE: Primary | ICD-10-CM

## 2021-08-09 DIAGNOSIS — R73.9 ELEVATED BLOOD SUGAR: ICD-10-CM

## 2021-08-09 PROCEDURE — 99214 OFFICE O/P EST MOD 30 MIN: CPT | Performed by: NURSE PRACTITIONER

## 2021-08-09 RX ORDER — EZETIMIBE 10 MG/1
10 TABLET ORAL DAILY
Qty: 90 TABLET | Refills: 3 | Status: SHIPPED | OUTPATIENT
Start: 2021-08-09

## 2021-08-09 NOTE — PATIENT INSTRUCTIONS
Can try red rice yeast to help with your cholesterol levels  Low Fat Diet   AMBULATORY CARE:   A low-fat diet  is an eating plan that is low in total fat, unhealthy fat, and cholesterol  You may need to follow a low-fat diet if you have trouble digesting or absorbing fat  You may also need to follow this diet if you have high cholesterol  You can also lower your cholesterol by increasing the amount of fiber in your diet  Soluble fiber is a type of fiber that helps to decrease cholesterol levels  Different types of fat in food:   · Limit unhealthy fats  A diet that is high in cholesterol, saturated fat, and trans fat may cause unhealthy cholesterol levels  Unhealthy cholesterol levels increase your risk of heart disease  ? Cholesterol:  Limit intake of cholesterol to less than 200 mg per day  Cholesterol is found in meat, eggs, and dairy  ? Saturated fat:  Limit saturated fat to less than 7% of your total daily calories  Ask your dietitian how many calories you need each day  Saturated fat is found in butter, cheese, ice cream, whole milk, and palm oil  Saturated fat is also found in meat, such as beef, pork, chicken skin, and processed meats  Processed meats include sausage, hot dogs, and bologna  ? Trans fat:  Avoid trans fat as much as possible  Trans fat is used in fried and baked foods  Foods that say trans fat free on the label may still have up to 0 5 grams of trans fat per serving  · Include healthy fats  Replace foods that are high in saturated and trans fat with foods high in healthy fats  This may help to decrease high cholesterol levels  ? Monounsaturated fats: These are found in avocados, nuts, and vegetable oils, such as olive, canola, and sunflower oil  ? Polyunsaturated fats: These can be found in vegetable oils, such as soybean or corn oil  Omega-3 fats can help to decrease the risk of heart disease   Omega-3 fats are found in fish, such as salmon, herring, trout, and margarine and vegetable oil spread    ? Low-fat salad dressing    Other ways to decrease fat:   · Read food labels before you buy foods  Choose foods that have less than 30% of calories from fat  Choose low-fat or fat-free dairy products  Remember that fat free does not mean calorie free  These foods still contain calories, and too many calories can lead to weight gain  · Trim fat from meat and avoid fried food  Trim all visible fat from meat before you cook it  Remove the skin from poultry  Do not go meat, fish, or poultry  Bake, roast, boil, or broil these foods instead  Avoid fried foods  Eat a baked potato instead of Western Dunia fries  Steam vegetables instead of sautéing them in butter  · Add less fat to foods  Use imitation ballard bits on salads and baked potatoes instead of regular ballard bits  Use fat-free or low-fat salad dressings instead of regular dressings  Use low-fat or nonfat butter-flavored topping instead of regular butter or margarine on popcorn and other foods  Ways to decrease fat in recipes:  Replace high-fat ingredients with low-fat or nonfat ones  This may cause baked goods to be drier than usual  You may need to use nonfat cooking spray on pans to prevent food from sticking  You also may need to change the amount of other ingredients, such as water, in the recipe  Try the following:  · Use low-fat or light margarine instead of regular margarine or shortening  · Use lean ground turkey breast or chicken, or lean ground beef (less than 5% fat) instead of hamburger  · Add 1 teaspoon of canola oil to 8 ounces of skim milk instead of using cream or half and half  · Use grated zucchini, carrots, or apples in breads instead of coconut  · Use blenderized, low-fat cottage cheese, plain tofu, or low-fat ricotta cheese instead of cream cheese  · Use 1 egg white and 1 teaspoon of canola oil, or use ¼ cup (2 ounces) of fat-free egg substitute instead of a whole egg  · Replace half of the oil that is called for in a recipe with applesauce when you bake  Use 3 tablespoons of cocoa powder and 1 tablespoon of canola oil instead of a square of baking chocolate  How to increase fiber:  Eat enough high-fiber foods to get 20 to 30 grams of fiber every day  Slowly increase your fiber intake to avoid stomach cramps, gas, and other problems  · Eat 3 ounces of whole-grain foods each day  An ounce is about 1 slice of bread  Eat whole-grain breads, such as whole-wheat bread  Whole wheat, whole-wheat flour, or other whole grains should be listed as the first ingredient on the food label  Replace white flour with whole-grain flour or use half of each in recipes  Whole-grain flour is heavier than white flour, so you may have to add more yeast or baking powder  · Eat a high-fiber cereal for breakfast   Oatmeal is a good source of soluble fiber  Look for cereals that have bran or fiber in the name  Choose whole-grain products, such as brown rice, barley, and whole-wheat pasta  · Eat more beans, peas, and lentils  For example, add beans to soups or salads  Eat at least 5 cups of fruits and vegetables each day  Eat fruits and vegetables with the peel because the peel is high in fiber  © Copyright [employer] 2021 Information is for End User's use only and may not be sold, redistributed or otherwise used for commercial purposes  All illustrations and images included in CareNotes® are the copyrighted property of [employer]  or [facility] Jimbo Ludwig  The above information is an  only  It is not intended as medical advice for individual conditions or treatments  Talk to your doctor, nurse or pharmacist before following any medical regimen to see if it is safe and effective for you

## 2021-08-09 NOTE — PROGRESS NOTES
[facility]        NAME: Ramon Chao is a 64 y o  male  : 1960    MRN: [id number]  DATE: 2021  TIME: 7:29 AM    Assessment and Plan   Statin intolerance [Z78 9]  1  Statin intolerance     2  Hypercholesterolemia  Lipid panel    ezetimibe (ZETIA) 10 mg tablet   3  Elevated blood sugar  Comprehensive metabolic panel    HEMOGLOBIN A1C W/ EAG ESTIMATION     1  Statin intolerance   muscles cramping- has tried atorvastatin, pravastatin and rosuvastatin  2  Hypercholesterolemia   Remain the same, compliant with medication  Will change diet  - Lipid panel; Future  - ezetimibe (ZETIA) 10 mg tablet; Take 1 tablet (10 mg total) by mouth daily  Dispense: 90 tablet; Refill: 3    3  Elevated blood sugar  Fasting glucose is 106, will continue to monitor  Last A1c was 5 6    - Comprehensive metabolic panel; Future  - HEMOGLOBIN A1C W/ EAG ESTIMATION; Future        Patient Instructions     Patient Instructions     Can try red rice yeast to help with your cholesterol levels  Low Fat Diet   AMBULATORY CARE:   A low-fat diet  is an eating plan that is low in total fat, unhealthy fat, and cholesterol  You may need to follow a low-fat diet if you have trouble digesting or absorbing fat  You may also need to follow this diet if you have high cholesterol  You can also lower your cholesterol by increasing the amount of fiber in your diet  Soluble fiber is a type of fiber that helps to decrease cholesterol levels  Different types of fat in food:   · Limit unhealthy fats  A diet that is high in cholesterol, saturated fat, and trans fat may cause unhealthy cholesterol levels  Unhealthy cholesterol levels increase your risk of heart disease  ? Cholesterol:  Limit intake of cholesterol to less than 200 mg per day  Cholesterol is found in meat, eggs, and dairy  ? Saturated fat:  Limit saturated fat to less than 7% of your total daily calories   Ask your dietitian how many calories you need each day  Saturated fat is found in butter, cheese, ice cream, whole milk, and palm oil  Saturated fat is also found in meat, such as beef, pork, chicken skin, and processed meats  Processed meats include sausage, hot dogs, and bologna  ? Trans fat:  Avoid trans fat as much as possible  Trans fat is used in fried and baked foods  Foods that say trans fat free on the label may still have up to 0 5 grams of trans fat per serving  · Include healthy fats  Replace foods that are high in saturated and trans fat with foods high in healthy fats  This may help to decrease high cholesterol levels  ? Monounsaturated fats: These are found in avocados, nuts, and vegetable oils, such as olive, canola, and sunflower oil  ? Polyunsaturated fats: These can be found in vegetable oils, such as soybean or corn oil  Omega-3 fats can help to decrease the risk of heart disease  Omega-3 fats are found in fish, such as salmon, herring, trout, and tuna  Omega-3 fats can also be found in plant foods, such as walnuts, flaxseed, soybeans, and canola oil  Foods to limit or avoid:   · Grains:      ? Snacks that are made with partially hydrogenated oils, such as chips, regular crackers, and butter-flavored popcorn    ? High-fat baked goods, such as biscuits, croissants, doughnuts, pies, cookies, and pastries    · Dairy:      ? Whole milk, 2% milk, and yogurt and ice cream made with whole milk    ? Half and half creamer, heavy cream, and whipping cream    ? Cheese, cream cheese, and sour cream    · Meats and proteins:      ? High-fat cuts of meat (T-bone steak, regular hamburger, and ribs)    ? Fried meat, poultry (turkey and chicken), and fish    ? Poultry (chicken and turkey) with skin    ? Cold cuts (salami or bologna), hot dogs, ballard, and sausage    ?  Whole eggs and egg yolks    · Vegetables and fruits with added fat:      ? Fried vegetables or vegetables in butter or high-fat sauces, such as cream or cheese sauces    ? Fried fruit or fruit served with butter or cream    · Fats:      ? Butter, stick margarine, and shortening    ? Coconut, palm oil, and palm kernel oil    Foods to include:   · Grains:      ? Whole-grain breads, cereals, pasta, and brown rice    ? Low-fat crackers and pretzels    · Vegetables and fruits:      ? Fresh, frozen, or canned vegetables (no salt or low-sodium)    ? Fresh, frozen, dried, or canned fruit (canned in light syrup or fruit juice)    ? Avocado    · Low-fat dairy products:      ? Nonfat (skim) or 1% milk    ? Nonfat or low-fat cheese, yogurt, and cottage cheese    · Meats and proteins:      ? Chicken or turkey with no skin    ? Baked or broiled fish    ? Lean beef and pork (loin, round, extra lean hamburger)    ? Beans and peas, unsalted nuts, soy products    ? Egg whites and substitutes    ? Seeds and nuts    · Fats:      ? Unsaturated oil, such as canola, olive, peanut, soybean, or sunflower oil    ? Soft or liquid margarine and vegetable oil spread    ? Low-fat salad dressing    Other ways to decrease fat:   · Read food labels before you buy foods  Choose foods that have less than 30% of calories from fat  Choose low-fat or fat-free dairy products  Remember that fat free does not mean calorie free  These foods still contain calories, and too many calories can lead to weight gain  · Trim fat from meat and avoid fried food  Trim all visible fat from meat before you cook it  Remove the skin from poultry  Do not go meat, fish, or poultry  Bake, roast, boil, or broil these foods instead  Avoid fried foods  Eat a baked potato instead of Western Dunia fries  Steam vegetables instead of sautéing them in butter  · Add less fat to foods  Use imitation ballard bits on salads and baked potatoes instead of regular ballard bits  Use fat-free or low-fat salad dressings instead of regular dressings   Use low-fat or nonfat butter-flavored topping instead of regular butter or margarine on popcorn and other foods  Ways to decrease fat in recipes:  Replace high-fat ingredients with low-fat or nonfat ones  This may cause baked goods to be drier than usual  You may need to use nonfat cooking spray on pans to prevent food from sticking  You also may need to change the amount of other ingredients, such as water, in the recipe  Try the following:  · Use low-fat or light margarine instead of regular margarine or shortening  · Use lean ground turkey breast or chicken, or lean ground beef (less than 5% fat) instead of hamburger  · Add 1 teaspoon of canola oil to 8 ounces of skim milk instead of using cream or half and half  · Use grated zucchini, carrots, or apples in breads instead of coconut  · Use blenderized, low-fat cottage cheese, plain tofu, or low-fat ricotta cheese instead of cream cheese  · Use 1 egg white and 1 teaspoon of canola oil, or use ¼ cup (2 ounces) of fat-free egg substitute instead of a whole egg  · Replace half of the oil that is called for in a recipe with applesauce when you bake  Use 3 tablespoons of cocoa powder and 1 tablespoon of canola oil instead of a square of baking chocolate  How to increase fiber:  Eat enough high-fiber foods to get 20 to 30 grams of fiber every day  Slowly increase your fiber intake to avoid stomach cramps, gas, and other problems  · Eat 3 ounces of whole-grain foods each day  An ounce is about 1 slice of bread  Eat whole-grain breads, such as whole-wheat bread  Whole wheat, whole-wheat flour, or other whole grains should be listed as the first ingredient on the food label  Replace white flour with whole-grain flour or use half of each in recipes  Whole-grain flour is heavier than white flour, so you may have to add more yeast or baking powder  · Eat a high-fiber cereal for breakfast   Oatmeal is a good source of soluble fiber  Look for cereals that have bran or fiber in the name   Choose whole-grain products, such as brown rice, barley, and whole-wheat pasta  · Eat more beans, peas, and lentils  For example, add beans to soups or salads  Eat at least 5 cups of fruits and vegetables each day  Eat fruits and vegetables with the peel because the peel is high in fiber  © Copyright [employer] 2021 Information is for End User's use only and may not be sold, redistributed or otherwise used for commercial purposes  All illustrations and images included in CareNotes® are the copyrighted property of [employer]  or [address] [place], [place] [address]sarah beth   The above information is an  only  It is not intended as medical advice for individual conditions or treatments  Talk to your doctor, nurse or pharmacist before following any medical regimen to see if it is safe and effective for you  Chief Complaint     Chief Complaint   Patient presents with    Follow-up         History of Present Illness       Patient here for follow up on his cholesterol levels  Patient has no complaints today  Lipid panel continues to be elevated  Fasting sugars is slightly elevated  Reports he could watch his diet, eating candy peanuts  Drinks mostly water  Review of Systems   Review of Systems   Constitutional: Negative for activity change, appetite change, chills, fatigue and fever  HENT: Negative for congestion, ear pain, nosebleeds, rhinorrhea and sore throat  Eyes: Negative for photophobia, pain, redness and visual disturbance  Respiratory: Negative for cough, shortness of breath and wheezing  Cardiovascular: Negative  Negative for chest pain  Gastrointestinal: Negative  Negative for abdominal pain, constipation, diarrhea and vomiting  Endocrine: Negative  Genitourinary: Negative for difficulty urinating, dysuria and flank pain  Musculoskeletal: Negative  Skin: Negative for color change and rash  Neurological: Negative for dizziness, weakness, numbness and headaches  Hematological: Negative for adenopathy  Psychiatric/Behavioral: Negative for agitation and confusion  The patient is not nervous/anxious  PHQ-9 Depression Screening    PHQ-9:   Frequency of the following problems over the past two weeks:      Little interest or pleasure in doing things: 0 - not at all  Feeling down, depressed, or hopeless: 0 - not at all  PHQ-2 Score: 0        Current Medications       Current Outpatient Medications:     ezetimibe (ZETIA) 10 mg tablet, Take 1 tablet (10 mg total) by mouth daily, Disp: 90 tablet, Rfl: 3    Current Allergies     Allergies as of 08/09/2021 - Reviewed 08/09/2021   Allergen Reaction Noted    Lipitor [atorvastatin]  10/30/2019            The following portions of the patient's history were reviewed and updated as appropriate: allergies, current medications, past family history, past medical history, past social history, past surgical history and problem list      Past Medical History:   Diagnosis Date    Arthritis     Herniated lumbar intervertebral disc     Hyperlipidemia     Lumbar radiculopathy        Past Surgical History:   Procedure Laterality Date    HERNIA REPAIR      KNEE SURGERY      LUMBAR LAMINECTOMY N/A 10/23/2018    Procedure: MIS/MetRx left L5-S1 hemiLAMINECTOMY, medial facetectmoy, & microdiscectomy;  Surgeon: Luis Gregg MD;  Location: [facility];  Service: Neurosurgery       Family History   Problem Relation Age of Onset    Pancreatic cancer Mother          Medications have been verified  Objective   /78 (BP Location: Left arm, Patient Position: Sitting, Cuff Size: Standard)   Pulse 80   Temp 97 9 °F (36 6 °C)   Resp 20   Ht 5' 10" (1 778 m)   Wt 80 1 kg (176 lb 9 6 oz)   SpO2 98%   BMI 25 34 kg/m²        Physical Exam     Physical Exam  Vitals and nursing note reviewed  Constitutional:       General: He is not in acute distress  Appearance: Normal appearance  He is well-developed  He is not ill-appearing     Eyes:      General: Lids are normal  Cardiovascular:      Rate and Rhythm: Normal rate and regular rhythm  Heart sounds: Normal heart sounds, S1 normal and S2 normal  No murmur heard  Pulmonary:      Effort: Pulmonary effort is normal  No respiratory distress  Breath sounds: Normal breath sounds  No decreased breath sounds or wheezing  Musculoskeletal:         General: No tenderness or deformity  Normal range of motion  Skin:     General: Skin is warm  Findings: No erythema or rash  Neurological:      Mental Status: He is alert and oriented to person, place, and time  Psychiatric:         Behavior: Behavior normal  Behavior is cooperative  Thought Content:  Thought content normal

## 2022-02-07 ENCOUNTER — APPOINTMENT (OUTPATIENT)
Dept: LAB | Facility: CLINIC | Age: 62
End: 2022-02-07
Payer: COMMERCIAL

## 2022-02-07 DIAGNOSIS — E78.00 HYPERCHOLESTEROLEMIA: ICD-10-CM

## 2022-02-07 DIAGNOSIS — R73.9 ELEVATED BLOOD SUGAR: ICD-10-CM

## 2022-02-07 LAB
ALBUMIN SERPL BCP-MCNC: 3.8 G/DL (ref 3.5–5)
ALP SERPL-CCNC: 66 U/L (ref 46–116)
ALT SERPL W P-5'-P-CCNC: 42 U/L (ref 12–78)
ANION GAP SERPL CALCULATED.3IONS-SCNC: 5 MMOL/L (ref 4–13)
AST SERPL W P-5'-P-CCNC: 24 U/L (ref 5–45)
BILIRUB SERPL-MCNC: 1.36 MG/DL (ref 0.2–1)
BUN SERPL-MCNC: 13 MG/DL (ref 5–25)
CALCIUM SERPL-MCNC: 9.2 MG/DL (ref 8.3–10.1)
CHLORIDE SERPL-SCNC: 107 MMOL/L (ref 100–108)
CHOLEST SERPL-MCNC: 263 MG/DL
CO2 SERPL-SCNC: 27 MMOL/L (ref 21–32)
CREAT SERPL-MCNC: 0.94 MG/DL (ref 0.6–1.3)
EST. AVERAGE GLUCOSE BLD GHB EST-MCNC: 117 MG/DL
GFR SERPL CREATININE-BSD FRML MDRD: 88 ML/MIN/1.73SQ M
GLUCOSE P FAST SERPL-MCNC: 111 MG/DL (ref 65–99)
HBA1C MFR BLD: 5.7 %
HDLC SERPL-MCNC: 37 MG/DL
LDLC SERPL CALC-MCNC: 188 MG/DL (ref 0–100)
NONHDLC SERPL-MCNC: 226 MG/DL
POTASSIUM SERPL-SCNC: 4 MMOL/L (ref 3.5–5.3)
PROT SERPL-MCNC: 7.8 G/DL (ref 6.4–8.2)
SODIUM SERPL-SCNC: 139 MMOL/L (ref 136–145)
TRIGL SERPL-MCNC: 191 MG/DL

## 2022-02-07 PROCEDURE — 80053 COMPREHEN METABOLIC PANEL: CPT

## 2022-02-07 PROCEDURE — 80061 LIPID PANEL: CPT

## 2022-02-07 PROCEDURE — 83036 HEMOGLOBIN GLYCOSYLATED A1C: CPT

## 2022-02-07 PROCEDURE — 36415 COLL VENOUS BLD VENIPUNCTURE: CPT

## 2022-02-10 ENCOUNTER — OFFICE VISIT (OUTPATIENT)
Dept: FAMILY MEDICINE CLINIC | Facility: CLINIC | Age: 62
End: 2022-02-10
Payer: COMMERCIAL

## 2022-02-10 VITALS
HEIGHT: 70 IN | HEART RATE: 76 BPM | BODY MASS INDEX: 25.48 KG/M2 | TEMPERATURE: 98 F | OXYGEN SATURATION: 98 % | WEIGHT: 178 LBS | SYSTOLIC BLOOD PRESSURE: 100 MMHG | DIASTOLIC BLOOD PRESSURE: 72 MMHG

## 2022-02-10 DIAGNOSIS — E78.00 HYPERCHOLESTEROLEMIA: Primary | ICD-10-CM

## 2022-02-10 DIAGNOSIS — Z78.9 STATIN INTOLERANCE: ICD-10-CM

## 2022-02-10 DIAGNOSIS — R73.03 PREDIABETES: ICD-10-CM

## 2022-02-10 PROCEDURE — 99213 OFFICE O/P EST LOW 20 MIN: CPT | Performed by: NURSE PRACTITIONER

## 2022-02-10 NOTE — PROGRESS NOTES
[facility]        NAME: Javon Darby is a 63 y o  male  : 1960    MRN: [id number]  DATE: February 10, 2022  TIME: 11:52 AM    Assessment and Plan   Hypercholesterolemia [E78 00]  1  Hypercholesterolemia  Lipid panel   2  Prediabetes  HEMOGLOBIN A1C W/ EAG ESTIMATION    Comprehensive metabolic panel   3  Statin intolerance       1  Hypercholesterolemia  Cholesterol levels increased since last checked, will recheck in 6 months  Will try to decrease red meat  - Lipid panel; Future    2  Prediabetes  hgba1c up to 5 7, recheck in 6 months  Discussed carbohydrates and sugars in his diet    - HEMOGLOBIN A1C W/ EAG ESTIMATION; Future  - Comprehensive metabolic panel; Future    3  Statin intolerance   taking zetia, but not taking everyday, will try to take everyday  Was forgetting in the evenings, will try taking in the morning  Patient Instructions     There are no Patient Instructions on file for this visit  Chief Complaint     Chief Complaint   Patient presents with    Follow-up     f/u HLD and labs  No problems or concerns for today  History of Present Illness       Patient here for 6 month follow up visit for cholesterol and to review labs  Cholesterol levels have increase,d patient admits to taking medication only 1-2 times per week  Can never remember to take in the evening with dinner  Will try to take everyday, might try to take in the mornings  HgbA1c- has bene borderline for the last few years, now is in the prediabetic range  Reports his mother was always borderline diabetic  Will continue to monitor this level  Continues with his chronic back pain, nothing worsening  Continues to hunt deer in the fall  Reports numbness in legs when he was hunting  Review of Systems   Review of Systems   Constitutional: Negative  Negative for appetite change, fatigue and fever  Respiratory: Negative    Negative for chest tightness, shortness of breath and wheezing  Cardiovascular: Negative  Negative for chest pain and palpitations  Musculoskeletal: Negative  Negative for arthralgias and myalgias  Skin: Negative  Negative for rash  Neurological: Negative  Negative for dizziness, light-headedness and headaches  PHQ-2/9 Depression Screening    Little interest or pleasure in doing things: 0 - not at all  Feeling down, depressed, or hopeless: 0 - not at all  PHQ-2 Score: 0  PHQ-2 Interpretation: Negative depression screen        Current Medications       Current Outpatient Medications:     ezetimibe (ZETIA) 10 mg tablet, Take 1 tablet (10 mg total) by mouth daily, Disp: 90 tablet, Rfl: 3    Current Allergies     Allergies as of 02/10/2022 - Reviewed 02/10/2022   Allergen Reaction Noted    Lipitor [atorvastatin]  10/30/2019            The following portions of the patient's history were reviewed and updated as appropriate: allergies, current medications, past family history, past medical history, past social history, past surgical history and problem list      Past Medical History:   Diagnosis Date    Arthritis     Herniated lumbar intervertebral disc     Hyperlipidemia     Lumbar radiculopathy        Past Surgical History:   Procedure Laterality Date    HERNIA REPAIR      KNEE SURGERY      LUMBAR LAMINECTOMY N/A 10/23/2018    Procedure: MIS/MetRx left L5-S1 hemiLAMINECTOMY, medial facetectmoy, & microdiscectomy;  Surgeon: Mildred Yu MD;  Location: [facility];  Service: Neurosurgery       Family History   Problem Relation Age of Onset    Pancreatic cancer Mother          Medications have been verified  Objective   /72   Pulse 76   Temp 98 °F (36 7 °C)   Ht 5' 10" (1 778 m)   Wt 80 7 kg (178 lb)   SpO2 98%   BMI 25 54 kg/m²        Physical Exam     Physical Exam  Vitals and nursing note reviewed  Constitutional:       General: He is not in acute distress  Appearance: Normal appearance   He is well-developed  He is not ill-appearing  Eyes:      General: Lids are normal    Cardiovascular:      Rate and Rhythm: Normal rate and regular rhythm  Heart sounds: Normal heart sounds, S1 normal and S2 normal    Pulmonary:      Effort: Pulmonary effort is normal  No respiratory distress  Breath sounds: Normal breath sounds  No decreased breath sounds or wheezing  Musculoskeletal:         General: No tenderness or deformity  Normal range of motion  Skin:     General: Skin is warm  Findings: No erythema or rash  Neurological:      Mental Status: He is alert and oriented to person, place, and time  Psychiatric:         Behavior: Behavior normal  Behavior is cooperative  Thought Content:  Thought content normal

## 2022-08-08 ENCOUNTER — TELEPHONE (OUTPATIENT)
Dept: FAMILY MEDICINE CLINIC | Facility: CLINIC | Age: 62
End: 2022-08-08

## 2022-10-17 ENCOUNTER — APPOINTMENT (OUTPATIENT)
Dept: LAB | Facility: CLINIC | Age: 62
End: 2022-10-17
Payer: COMMERCIAL

## 2022-10-17 DIAGNOSIS — R73.03 PREDIABETES: ICD-10-CM

## 2022-10-17 DIAGNOSIS — E78.00 HYPERCHOLESTEROLEMIA: ICD-10-CM

## 2022-10-17 LAB
ALBUMIN SERPL BCP-MCNC: 3.6 G/DL (ref 3.5–5)
ALP SERPL-CCNC: 66 U/L (ref 46–116)
ALT SERPL W P-5'-P-CCNC: 40 U/L (ref 12–78)
ANION GAP SERPL CALCULATED.3IONS-SCNC: 3 MMOL/L (ref 4–13)
AST SERPL W P-5'-P-CCNC: 23 U/L (ref 5–45)
BILIRUB SERPL-MCNC: 0.59 MG/DL (ref 0.2–1)
BUN SERPL-MCNC: 14 MG/DL (ref 5–25)
CALCIUM SERPL-MCNC: 8.8 MG/DL (ref 8.3–10.1)
CHLORIDE SERPL-SCNC: 109 MMOL/L (ref 96–108)
CHOLEST SERPL-MCNC: 199 MG/DL
CO2 SERPL-SCNC: 25 MMOL/L (ref 21–32)
CREAT SERPL-MCNC: 0.92 MG/DL (ref 0.6–1.3)
EST. AVERAGE GLUCOSE BLD GHB EST-MCNC: 114 MG/DL
GFR SERPL CREATININE-BSD FRML MDRD: 90 ML/MIN/1.73SQ M
GLUCOSE P FAST SERPL-MCNC: 127 MG/DL (ref 65–99)
HBA1C MFR BLD: 5.6 %
HDLC SERPL-MCNC: 41 MG/DL
LDLC SERPL CALC-MCNC: 143 MG/DL (ref 0–100)
NONHDLC SERPL-MCNC: 158 MG/DL
POTASSIUM SERPL-SCNC: 4.1 MMOL/L (ref 3.5–5.3)
PROT SERPL-MCNC: 7.5 G/DL (ref 6.4–8.4)
SODIUM SERPL-SCNC: 137 MMOL/L (ref 135–147)
TRIGL SERPL-MCNC: 76 MG/DL

## 2022-10-17 PROCEDURE — 36415 COLL VENOUS BLD VENIPUNCTURE: CPT

## 2022-10-17 PROCEDURE — 83036 HEMOGLOBIN GLYCOSYLATED A1C: CPT

## 2022-10-17 PROCEDURE — 80061 LIPID PANEL: CPT

## 2022-10-17 PROCEDURE — 80053 COMPREHEN METABOLIC PANEL: CPT

## 2022-10-19 ENCOUNTER — OFFICE VISIT (OUTPATIENT)
Dept: FAMILY MEDICINE CLINIC | Facility: CLINIC | Age: 62
End: 2022-10-19
Payer: COMMERCIAL

## 2022-10-19 VITALS
BODY MASS INDEX: 24.62 KG/M2 | HEIGHT: 70 IN | OXYGEN SATURATION: 98 % | DIASTOLIC BLOOD PRESSURE: 76 MMHG | TEMPERATURE: 98.5 F | HEART RATE: 85 BPM | WEIGHT: 172 LBS | SYSTOLIC BLOOD PRESSURE: 114 MMHG

## 2022-10-19 DIAGNOSIS — Z23 ENCOUNTER FOR IMMUNIZATION: Primary | ICD-10-CM

## 2022-10-19 DIAGNOSIS — R73.03 PREDIABETES: ICD-10-CM

## 2022-10-19 DIAGNOSIS — Z00.00 ANNUAL PHYSICAL EXAM: ICD-10-CM

## 2022-10-19 DIAGNOSIS — E78.00 HYPERCHOLESTEROLEMIA: ICD-10-CM

## 2022-10-19 DIAGNOSIS — Z12.5 SCREENING PSA (PROSTATE SPECIFIC ANTIGEN): ICD-10-CM

## 2022-10-19 DIAGNOSIS — Z87.891 HISTORY OF SMOKING 10-25 PACK YEARS: ICD-10-CM

## 2022-10-19 PROCEDURE — 90682 RIV4 VACC RECOMBINANT DNA IM: CPT

## 2022-10-19 PROCEDURE — 99386 PREV VISIT NEW AGE 40-64: CPT | Performed by: NURSE PRACTITIONER

## 2022-10-19 PROCEDURE — 90471 IMMUNIZATION ADMIN: CPT

## 2022-10-19 RX ORDER — EZETIMIBE 10 MG/1
10 TABLET ORAL DAILY
Qty: 90 TABLET | Refills: 3 | Status: SHIPPED | OUTPATIENT
Start: 2022-10-19

## 2022-10-19 NOTE — PROGRESS NOTES
ADULT ANNUAL [facility]    NAME: Meryl Roberts  AGE: 63 y o  SEX: male  : 1960     DATE: 10/19/2022     Assessment and Plan:     Problem List Items Addressed This Visit        Other    History of smoking 10-25 pack years    Hypercholesterolemia    Relevant Medications    ezetimibe (ZETIA) 10 mg tablet    Other Relevant Orders    Comprehensive metabolic panel    Lipid panel      Other Visit Diagnoses     Encounter for immunization    -  Primary    Relevant Orders    influenza vaccine, quadrivalent, recombinant, PF, 0 5 mL, for patients 18 yr+ (FLUBLOK) (Completed)    Annual physical exam        Prediabetes        Relevant Orders    HEMOGLOBIN A1C W/ EAG ESTIMATION    Screening PSA (prostate specific antigen)        Relevant Orders    PSA, Total Screen      1  Hypercholesterolemia  Take zetia daily  - ezetimibe (ZETIA) 10 mg tablet; Take 1 tablet (10 mg total) by mouth daily  Dispense: 90 tablet; Refill: 3  - Comprehensive metabolic panel; Future  - Lipid panel; Future    2  Encounter for immunization    - influenza vaccine, quadrivalent, recombinant, PF, 0 5 mL, for patients 18 yr+ (FLUBLOK)    3  Annual physical exam      4  Prediabetes  HgbA1c decreased to 5  6  will continue to monitor    - HEMOGLOBIN A1C W/ EAG ESTIMATION; Future    5  History of smoking 10-25 pack years  Quit smoking cigarettes but continues to smoke marijuana  6  Screening PSA (prostate specific antigen)    - PSA, Total Screen; Future      Immunizations and preventive care screenings were discussed with patient today  Appropriate education was printed on patient's after visit summary  Discussed risks and benefits of prostate cancer screening  We discussed the controversial history of PSA screening for prostate cancer in the [place] as well as the risk of over detection and over treatment of prostate cancer by way of PSA screening    The patient understands that PSA blood testing is an imperfect way to screen for prostate cancer and that elevated PSA levels in the blood may also be caused by infection, inflammation, prostatic trauma or manipulation, urological procedures, or by benign prostatic enlargement  The role of the digital rectal examination in prostate cancer screening was also discussed and I discussed with him that there is large interobserver variability in the findings of digital rectal examination  Counseling:  Alcohol/drug use: discussed moderation in alcohol intake, the recommendations for healthy alcohol use, and avoidance of illicit drug use  Dental Health: discussed importance of regular tooth brushing, flossing, and dental visits  Sexual health: discussed sexually transmitted diseases, partner selection, use of condoms, avoidance of unintended pregnancy, and contraceptive alternatives  · Exercise: the importance of regular exercise/physical activity was discussed  Recommend exercise 3-5 times per week for at least 30 minutes  Depression Screening and Follow-up Plan: Patient was screened for depression during today's encounter  They screened negative with a PHQ-2 score of 0  Return in 1 year (on 10/19/2023)  Chief Complaint:     Chief Complaint   Patient presents with   • Annual Exam   • Follow-up      History of Present Illness:     Adult Annual Physical   Patient here for a comprehensive physical exam  The patient reports no problems  Diet and Physical Activity  · Diet/Nutrition: well balanced diet, limited junk food and consuming 3-5 servings of fruits/vegetables daily  · Exercise: no formal exercise        Depression Screening  PHQ-2/9 Depression Screening    Little interest or pleasure in doing things: 0 - not at all  Feeling down, depressed, or hopeless: 0 - not at all  PHQ-2 Score: 0  PHQ-2 Interpretation: Negative depression screen       General Health  · Sleep: sleeps well and gets 4-6 hours of sleep on average  · Hearing: normal - bilateral   · Vision: most recent eye exam >1 year ago and reading glasses  · Dental: no dental visits for >1 year and brushes teeth twice daily   Health  · Symptoms include: none     Review of Systems:     Review of Systems   Constitutional: Negative for activity change, appetite change, chills, fatigue and fever  HENT: Negative for congestion, ear pain, nosebleeds, rhinorrhea and sore throat  Eyes: Negative for photophobia, pain, redness and visual disturbance  Respiratory: Negative for cough, shortness of breath and wheezing  Cardiovascular: Negative  Negative for chest pain  Gastrointestinal: Negative  Negative for abdominal pain, constipation, diarrhea and vomiting  Endocrine: Negative  Genitourinary: Negative for difficulty urinating, dysuria and flank pain  Musculoskeletal: Positive for arthralgias (legs)  Skin: Negative for color change and rash  Neurological: Negative for dizziness, weakness, numbness and headaches  Hematological: Negative for adenopathy  Psychiatric/Behavioral: Negative for agitation and confusion  The patient is not nervous/anxious         Past Medical History:     Past Medical History:   Diagnosis Date   • Arthritis    • Herniated lumbar intervertebral disc    • Hyperlipidemia    • Lumbar radiculopathy       Past Surgical History:     Past Surgical History:   Procedure Laterality Date   • HERNIA REPAIR     • KNEE SURGERY     • LUMBAR LAMINECTOMY N/A 10/23/2018    Procedure: MIS/[facility]Rx left L5-S1 hemiLAMINECTOMY, medial facetectmoy, & microdiscectomy;  Surgeon: Chelsey Jain MD;  Location:  MAIN OR;  Service: Neurosurgery      Family History:     Family History   Problem Relation Age of Onset   • Pancreatic cancer Mother       Social History:     Social History     Socioeconomic History   • Marital status: /Civil Union     Spouse name: Nereyda Herrera   • Number of children: 3   • Years of education: 12   • Highest education level: None   Occupational History   • Occupation: Contractor   Tobacco Use   • Smoking status: Former Smoker     Packs/day: 0 50     Years: 40 00     Pack years: 20 00     Quit date: 2017     Years since quittin 8   • Smokeless tobacco: Never Used   Vaping Use   • Vaping Use: Never used   Substance and Sexual Activity   • Alcohol use: No   • Drug use: Yes     Types: Marijuana   • Sexual activity: Yes   Other Topics Concern   • None   Social History Narrative    Lives at home with spouse and 2 grandkids     Social Determinants of Health     Financial Resource Strain: Not on file   Food Insecurity: Not on file   Transportation Needs: Not on file   Physical Activity: Not on file   Stress: Not on file   Social Connections: Not on file   Intimate Partner Violence: Not on file   Housing Stability: Not on file      Current Medications:     Current Outpatient Medications   Medication Sig Dispense Refill   • ezetimibe (ZETIA) 10 mg tablet Take 1 tablet (10 mg total) by mouth daily 90 tablet 3     No current facility-administered medications for this visit  Allergies: Allergies   Allergen Reactions   • Lipitor [Atorvastatin]      Leg Cramping  Physical Exam:     /76   Pulse 85   Temp 98 5 °F (36 9 °C)   Ht 5' 10" (1 778 m)   Wt 78 kg (172 lb)   SpO2 98%   BMI 24 68 kg/m²     Physical Exam  Vitals and nursing note reviewed  Constitutional:       General: He is not in acute distress  Appearance: He is well-developed  He is not diaphoretic  Cardiovascular:      Rate and Rhythm: Normal rate and regular rhythm  Heart sounds: Normal heart sounds  No murmur heard  No friction rub  No gallop  Pulmonary:      Effort: Pulmonary effort is normal  No respiratory distress  Breath sounds: Normal breath sounds  No wheezing or rales  Abdominal:      General: Bowel sounds are normal  There is no distension  Palpations: Abdomen is soft  Tenderness:  There is no abdominal tenderness  There is no guarding  Hernia: No hernia is present  Musculoskeletal:         General: Normal range of motion  Skin:     General: Skin is warm and dry  Capillary Refill: Capillary refill takes less than 2 seconds  Findings: No erythema or rash  Neurological:      Mental Status: He is alert  Psychiatric:         Behavior: Behavior normal  Behavior is cooperative  Thought Content:  Thought content normal           DEVYN Love  [facility]

## 2022-10-19 NOTE — PATIENT INSTRUCTIONS

## 2023-06-13 DIAGNOSIS — E78.00 HYPERCHOLESTEROLEMIA: ICD-10-CM

## 2023-06-13 RX ORDER — EZETIMIBE 10 MG/1
10 TABLET ORAL DAILY
Qty: 90 TABLET | Refills: 3 | Status: SHIPPED | OUTPATIENT
Start: 2023-06-13

## 2023-06-13 NOTE — TELEPHONE ENCOUNTER
Patient requesting refill(s) of: Zetia     Last filled: 10/19/2022  Last appt: 10/19/2022  Next appt: 6/23/2023  Pharmacy:    [facility]

## 2023-06-19 ENCOUNTER — APPOINTMENT (OUTPATIENT)
Dept: LAB | Facility: CLINIC | Age: 63
End: 2023-06-19
Payer: COMMERCIAL

## 2023-06-19 DIAGNOSIS — Z12.5 SCREENING PSA (PROSTATE SPECIFIC ANTIGEN): ICD-10-CM

## 2023-06-19 DIAGNOSIS — E78.00 HYPERCHOLESTEROLEMIA: ICD-10-CM

## 2023-06-19 DIAGNOSIS — R73.03 PREDIABETES: ICD-10-CM

## 2023-06-19 LAB
ALBUMIN SERPL BCP-MCNC: 3.8 G/DL (ref 3.5–5)
ALP SERPL-CCNC: 65 U/L (ref 46–116)
ALT SERPL W P-5'-P-CCNC: 28 U/L (ref 12–78)
ANION GAP SERPL CALCULATED.3IONS-SCNC: 1 MMOL/L (ref 4–13)
AST SERPL W P-5'-P-CCNC: 17 U/L (ref 5–45)
BILIRUB SERPL-MCNC: 0.42 MG/DL (ref 0.2–1)
BUN SERPL-MCNC: 18 MG/DL (ref 5–25)
CALCIUM SERPL-MCNC: 8.8 MG/DL (ref 8.3–10.1)
CHLORIDE SERPL-SCNC: 115 MMOL/L (ref 96–108)
CHOLEST SERPL-MCNC: 200 MG/DL
CO2 SERPL-SCNC: 24 MMOL/L (ref 21–32)
CREAT SERPL-MCNC: 1.01 MG/DL (ref 0.6–1.3)
EST. AVERAGE GLUCOSE BLD GHB EST-MCNC: 114 MG/DL
GFR SERPL CREATININE-BSD FRML MDRD: 80 ML/MIN/1.73SQ M
GLUCOSE P FAST SERPL-MCNC: 113 MG/DL (ref 65–99)
HBA1C MFR BLD: 5.6 %
HDLC SERPL-MCNC: 37 MG/DL
LDLC SERPL CALC-MCNC: 137 MG/DL (ref 0–100)
NONHDLC SERPL-MCNC: 163 MG/DL
POTASSIUM SERPL-SCNC: 3.9 MMOL/L (ref 3.5–5.3)
PROT SERPL-MCNC: 7.8 G/DL (ref 6.4–8.4)
SODIUM SERPL-SCNC: 140 MMOL/L (ref 135–147)
TRIGL SERPL-MCNC: 128 MG/DL

## 2023-06-19 PROCEDURE — 80061 LIPID PANEL: CPT

## 2023-06-19 PROCEDURE — G0103 PSA SCREENING: HCPCS

## 2023-06-19 PROCEDURE — 83036 HEMOGLOBIN GLYCOSYLATED A1C: CPT

## 2023-06-19 PROCEDURE — 80053 COMPREHEN METABOLIC PANEL: CPT

## 2023-06-19 PROCEDURE — 36415 COLL VENOUS BLD VENIPUNCTURE: CPT

## 2023-06-20 LAB — PSA SERPL-MCNC: 0.75 NG/ML (ref 0–4)

## 2023-06-23 ENCOUNTER — OFFICE VISIT (OUTPATIENT)
Dept: FAMILY MEDICINE CLINIC | Facility: CLINIC | Age: 63
End: 2023-06-23
Payer: COMMERCIAL

## 2023-06-23 VITALS
WEIGHT: 177 LBS | SYSTOLIC BLOOD PRESSURE: 118 MMHG | BODY MASS INDEX: 25.34 KG/M2 | DIASTOLIC BLOOD PRESSURE: 70 MMHG | HEIGHT: 70 IN | HEART RATE: 76 BPM | TEMPERATURE: 97.7 F | RESPIRATION RATE: 18 BRPM | OXYGEN SATURATION: 99 %

## 2023-06-23 DIAGNOSIS — R73.9 ELEVATED BLOOD SUGAR: ICD-10-CM

## 2023-06-23 DIAGNOSIS — E78.00 HYPERCHOLESTEROLEMIA: Primary | ICD-10-CM

## 2023-06-23 DIAGNOSIS — Z78.9 STATIN INTOLERANCE: ICD-10-CM

## 2023-06-23 PROCEDURE — 99213 OFFICE O/P EST LOW 20 MIN: CPT | Performed by: NURSE PRACTITIONER

## 2023-06-23 NOTE — PROGRESS NOTES
[facility]        NAME: Herbert Diaz is a 63 y o  male  : 1960    MRN: [id number]  DATE: 2023  TIME: 8:43 AM    Assessment and Plan   Hypercholesterolemia [E78 00]  1  Hypercholesterolemia  Lipid panel    Basic metabolic panel      2  Elevated blood sugar  HEMOGLOBIN A1C W/ EAG ESTIMATION      3  Statin intolerance          1  Hypercholesterolemia      2  Elevated blood sugar  HgbA1c is 5 6      3  Statin intolerance   on Zetia instead  Patient Instructions     There are no Patient Instructions on file for this visit  Chief Complaint     Chief Complaint   Patient presents with   • Follow-up         History of Present Illness       Patient here for routine follow up visit  Left knee pain ongoing, history of swollen mensicus  Knee swollen by the end of the day  Has been wearing compression stockings  Cholesterol- has not been watching his diet  Cholesterol  Is 200 LDL            Review of Systems   Review of Systems   Constitutional: Negative  Negative for fatigue and fever  Respiratory: Negative  Negative for shortness of breath and wheezing  Cardiovascular: Negative  Negative for chest pain and palpitations  Musculoskeletal: Positive for arthralgias  Neurological: Negative  Negative for dizziness, light-headedness and headaches  Psychiatric/Behavioral: Negative  The patient is not nervous/anxious          PHQ-2/9 Depression Screening    Little interest or pleasure in doing things: 0 - not at all  Feeling down, depressed, or hopeless: 0 - not at all  PHQ-2 Score: 0  PHQ-2 Interpretation: Negative depression screen        Current Medications       Current Outpatient Medications:   •  ezetimibe (ZETIA) 10 mg tablet, Take 1 tablet (10 mg total) by mouth daily, Disp: 90 tablet, Rfl: 3    Current Allergies     Allergies as of 2023 - Reviewed 2023   Allergen Reaction Noted   • Lipitor [atorvastatin]  10/30/2019            The "following portions of the patient's history were reviewed and updated as appropriate: allergies, current medications, past family history, past medical history, past social history, past surgical history and problem list      Past Medical History:   Diagnosis Date   • Arthritis    • Herniated lumbar intervertebral disc    • Hyperlipidemia    • Lumbar radiculopathy        Past Surgical History:   Procedure Laterality Date   • HERNIA REPAIR     • KNEE SURGERY     • LUMBAR LAMINECTOMY N/A 10/23/2018    Procedure: MIS/[facility] left L5-S1 hemiLAMINECTOMY, medial facetectmoy, & microdiscectomy;  Surgeon: Gordo Gonzales MD;  Location: [facility];  Service: Neurosurgery       Family History   Problem Relation Age of Onset   • Pancreatic cancer Mother          Medications have been verified  Objective   /70   Pulse 76   Temp 97 7 °F (36 5 °C)   Resp 18   Ht 5' 10\" (1 778 m)   Wt 80 3 kg (177 lb)   SpO2 99%   BMI 25 40 kg/m²        Physical Exam     Physical Exam  Vitals and nursing note reviewed  Constitutional:       General: He is not in acute distress  Appearance: Normal appearance  He is well-developed  He is not ill-appearing  Eyes:      General: Lids are normal    Cardiovascular:      Rate and Rhythm: Normal rate and regular rhythm  Heart sounds: Normal heart sounds, S1 normal and S2 normal  No murmur heard  Pulmonary:      Effort: Pulmonary effort is normal  No respiratory distress  Breath sounds: Normal breath sounds  No decreased breath sounds or wheezing  Musculoskeletal:         General: No tenderness or deformity  Normal range of motion  Skin:     General: Skin is warm  Findings: No erythema or rash  Neurological:      Mental Status: He is alert and oriented to person, place, and time  Psychiatric:         Behavior: Behavior normal  Behavior is cooperative  Thought Content:  Thought content normal              "

## 2023-12-18 ENCOUNTER — APPOINTMENT (OUTPATIENT)
Dept: LAB | Facility: CLINIC | Age: 63
End: 2023-12-18
Payer: COMMERCIAL

## 2023-12-18 DIAGNOSIS — E78.00 HYPERCHOLESTEROLEMIA: ICD-10-CM

## 2023-12-18 DIAGNOSIS — R73.9 ELEVATED BLOOD SUGAR: ICD-10-CM

## 2023-12-18 LAB
ANION GAP SERPL CALCULATED.3IONS-SCNC: 7 MMOL/L
BUN SERPL-MCNC: 16 MG/DL (ref 5–25)
CALCIUM SERPL-MCNC: 8.8 MG/DL (ref 8.4–10.2)
CHLORIDE SERPL-SCNC: 108 MMOL/L (ref 96–108)
CHOLEST SERPL-MCNC: 226 MG/DL
CO2 SERPL-SCNC: 23 MMOL/L (ref 21–32)
CREAT SERPL-MCNC: 0.89 MG/DL (ref 0.6–1.3)
GFR SERPL CREATININE-BSD FRML MDRD: 92 ML/MIN/1.73SQ M
GLUCOSE P FAST SERPL-MCNC: 112 MG/DL (ref 65–99)
HDLC SERPL-MCNC: 41 MG/DL
LDLC SERPL CALC-MCNC: 163 MG/DL (ref 0–100)
NONHDLC SERPL-MCNC: 185 MG/DL
POTASSIUM SERPL-SCNC: 3.9 MMOL/L (ref 3.5–5.3)
SODIUM SERPL-SCNC: 138 MMOL/L (ref 135–147)
TRIGL SERPL-MCNC: 112 MG/DL

## 2023-12-18 PROCEDURE — 36415 COLL VENOUS BLD VENIPUNCTURE: CPT

## 2023-12-18 PROCEDURE — 80048 BASIC METABOLIC PNL TOTAL CA: CPT

## 2023-12-18 PROCEDURE — 83036 HEMOGLOBIN GLYCOSYLATED A1C: CPT

## 2023-12-18 PROCEDURE — 80061 LIPID PANEL: CPT

## 2023-12-19 ENCOUNTER — OFFICE VISIT (OUTPATIENT)
Dept: FAMILY MEDICINE CLINIC | Facility: CLINIC | Age: 63
End: 2023-12-19

## 2023-12-19 VITALS
HEIGHT: 70 IN | OXYGEN SATURATION: 99 % | WEIGHT: 182 LBS | DIASTOLIC BLOOD PRESSURE: 82 MMHG | BODY MASS INDEX: 26.05 KG/M2 | SYSTOLIC BLOOD PRESSURE: 120 MMHG | HEART RATE: 80 BPM | RESPIRATION RATE: 18 BRPM | TEMPERATURE: 97.6 F

## 2023-12-19 DIAGNOSIS — R73.03 PREDIABETES: ICD-10-CM

## 2023-12-19 DIAGNOSIS — Z23 ENCOUNTER FOR IMMUNIZATION: Primary | ICD-10-CM

## 2023-12-19 DIAGNOSIS — Z12.5 SCREENING PSA (PROSTATE SPECIFIC ANTIGEN): ICD-10-CM

## 2023-12-19 DIAGNOSIS — Z00.00 ANNUAL PHYSICAL EXAM: ICD-10-CM

## 2023-12-19 DIAGNOSIS — E78.00 HYPERCHOLESTEROLEMIA: ICD-10-CM

## 2023-12-19 DIAGNOSIS — R31.9 HEMATURIA, UNSPECIFIED TYPE: ICD-10-CM

## 2023-12-19 LAB
EST. AVERAGE GLUCOSE BLD GHB EST-MCNC: 123 MG/DL
HBA1C MFR BLD: 5.9 %

## 2023-12-19 NOTE — PROGRESS NOTES
ADULT ANNUAL PHYSICAL  [facility]    NAME: Ulises Soares  AGE: 63 y.o. SEX: male  : 1960     DATE: 2023     Assessment and Plan:     Problem List Items Addressed This Visit       Hypercholesterolemia    Relevant Orders    Lipid panel     Other Visit Diagnoses       Encounter for immunization    -  Primary    Relevant Orders    influenza vaccine, quadrivalent, 0.5 mL, preservative-free, for adult and pediatric patients 6 mos+ (AFLURIA, FLUARIX, FLULAVAL, FLUZONE) (Completed)    Hematuria, unspecified type        Relevant Orders    US kidney and bladder    Urinalysis with microscopic    Urine culture    CBC and Platelet    Annual physical exam        Relevant Orders    Comprehensive metabolic panel    Hemoglobin A1C    CBC and Platelet    Lipid panel    Prediabetes        Relevant Orders    Comprehensive metabolic panel    Hemoglobin A1C    Screening PSA (prostate specific antigen)        Relevant Orders    PSA Total, Diagnostic            Immunizations and preventive care screenings were discussed with patient today. Appropriate education was printed on patient's after visit summary.    Discussed risks and benefits of prostate cancer screening. We discussed the controversial history of PSA screening for prostate cancer in the [place] as well as the risk of over detection and over treatment of prostate cancer by way of PSA screening.  The patient understands that PSA blood testing is an imperfect way to screen for prostate cancer and that elevated PSA levels in the blood may also be caused by infection, inflammation, prostatic trauma or manipulation, urological procedures, or by benign prostatic enlargement.    The role of the digital rectal examination in prostate cancer screening was also discussed and I discussed with him that there is large interobserver variability in the findings of digital rectal  examination.    Counseling:  Alcohol/drug use: discussed moderation in alcohol intake, the recommendations for healthy alcohol use, and avoidance of illicit drug use.  Dental Health: discussed importance of regular tooth brushing, flossing, and dental visits.  Sexual health: discussed sexually transmitted diseases, partner selection, use of condoms, avoidance of unintended pregnancy, and contraceptive alternatives.  Exercise: the importance of regular exercise/physical activity was discussed. Recommend exercise 3-5 times per week for at least 30 minutes.     BMI Counseling: Body mass index is 26.11 kg/m². The BMI is above normal. Nutrition recommendations include decreasing portion sizes, encouraging healthy choices of fruits and vegetables, limiting drinks that contain sugar, moderation in carbohydrate intake, reducing intake of saturated and trans fat and reducing intake of cholesterol. Exercise recommendations include exercising 3-5 times per week. Rationale for BMI follow-up plan is due to patient being overweight or obese.     Depression Screening and Follow-up Plan: Patient was screened for depression during today's encounter. They screened negative with a PHQ-2 score of 0.        Return in 6 months (on 6/19/2024).     Chief Complaint:     Chief Complaint   Patient presents with    Annual Exam      History of Present Illness:     Adult Annual Physical   Patient here for a comprehensive physical exam. The patient reports problems - peeing blood when more physically active, no pain. Does have history of kidneys stones. Reports when it happens urine is brown and bloody.  .    Diet and Physical Activity  Diet/Nutrition: well balanced diet, low calorie diet, and limited fruits/vegetables.   Exercise: no formal exercise and very active hunting and walking.  .      Depression Screening  PHQ-2/9 Depression Screening    Little interest or pleasure in doing things: 0 - not at all  Feeling down, depressed, or hopeless: 0  - not at all  PHQ-2 Score: 0  PHQ-2 Interpretation: Negative depression screen       General Health  Sleep: sleeps well and gets 4-6 hours of sleep on average.   Hearing: normal - bilateral.  Vision: goes for regular eye exams, most recent eye exam >1 year ago, wears glasses, and reading glasses .   Dental: regular dental visits and brushes teeth once daily.        Health  Symptoms include: none    Advanced Care Planning  Do you have an advanced directive? no  Do you have a durable medical power of ? no     Review of Systems:     Review of Systems   Constitutional:  Negative for activity change, appetite change, chills, fatigue and fever.   HENT:  Negative for congestion, ear pain, nosebleeds, rhinorrhea and sore throat.    Eyes:  Negative for photophobia, pain, redness and visual disturbance.   Respiratory:  Negative for cough, shortness of breath and wheezing.    Cardiovascular: Negative.  Negative for chest pain.   Gastrointestinal: Negative.  Negative for abdominal pain, constipation, diarrhea and vomiting.   Endocrine: Negative.    Genitourinary:  Positive for hematuria. Negative for difficulty urinating, dysuria and flank pain.   Musculoskeletal: Negative.    Skin:  Negative for color change and rash.   Neurological:  Negative for dizziness, weakness, numbness and headaches.   Hematological:  Negative for adenopathy.   Psychiatric/Behavioral:  Negative for agitation and confusion. The patient is not nervous/anxious.       Past Medical History:     Past Medical History:   Diagnosis Date    Arthritis     Herniated lumbar intervertebral disc     Hyperlipidemia     Lumbar radiculopathy       Past Surgical History:     Past Surgical History:   Procedure Laterality Date    HERNIA REPAIR      KNEE SURGERY      LUMBAR LAMINECTOMY N/A 10/23/2018    Procedure: MIS/[facility] left L5-S1 hemiLAMINECTOMY, medial facetectmoy, & microdiscectomy;  Surgeon: Dar Alcantara MD;  Location: [facility];  Service: Neurosurgery  "     Family History:     Family History   Problem Relation Age of Onset    Pancreatic cancer Mother       Social History:     Social History     Socioeconomic History    Marital status: /Civil Union     Spouse name: Karina    Number of children: 3    Years of education: 12    Highest education level: None   Occupational History    Occupation: Contractor   Tobacco Use    Smoking status: Former     Current packs/day: 0.00     Average packs/day: 0.5 packs/day for 40.0 years (20.0 ttl pk-yrs)     Types: Cigarettes     Start date: 1977     Quit date: 2017     Years since quittin.0    Smokeless tobacco: Never   Vaping Use    Vaping status: Never Used   Substance and Sexual Activity    Alcohol use: No    Drug use: Yes     Types: Marijuana    Sexual activity: Yes     Partners: Female   Other Topics Concern    None   Social History Narrative    Lives at home with spouse and 2 grandkids     Social Determinants of Health     Financial Resource Strain: Not on file   Food Insecurity: Not on file   Transportation Needs: Not on file   Physical Activity: Not on file   Stress: Not on file   Social Connections: Not on file   Intimate Partner Violence: Not on file   Housing Stability: Not on file      Current Medications:     Current Outpatient Medications   Medication Sig Dispense Refill    ezetimibe (ZETIA) 10 mg tablet Take 1 tablet (10 mg total) by mouth daily 90 tablet 3     No current facility-administered medications for this visit.      Allergies:     Allergies   Allergen Reactions    Lipitor [Atorvastatin]      Leg Cramping.       Physical Exam:     /82   Pulse 80   Temp 97.6 °F (36.4 °C) (Tympanic)   Resp 18   Ht 5' 10\" (1.778 m)   Wt 82.6 kg (182 lb)   SpO2 99%   BMI 26.11 kg/m²     Physical Exam  Vitals and nursing note reviewed.   Constitutional:       General: He is not in acute distress.     Appearance: He is well-developed. He is not diaphoretic.   HENT:      Head: Normocephalic and " atraumatic.   Cardiovascular:      Rate and Rhythm: Normal rate and regular rhythm.      Heart sounds: Normal heart sounds. No murmur heard.  Pulmonary:      Effort: Pulmonary effort is normal. No respiratory distress.      Breath sounds: Normal breath sounds. No wheezing or rales.   Abdominal:      General: Bowel sounds are normal. There is no distension.      Palpations: Abdomen is soft.      Tenderness: There is no abdominal tenderness. There is no guarding.      Hernia: No hernia is present.   Musculoskeletal:         General: Normal range of motion.   Skin:     General: Skin is warm and dry.      Capillary Refill: Capillary refill takes less than 2 seconds.      Findings: No erythema or rash.   Neurological:      Mental Status: He is alert.   Psychiatric:         Behavior: Behavior normal. Behavior is cooperative.         Thought Content: Thought content normal.          DEVYN Mesa  [facility]

## 2023-12-21 ENCOUNTER — HOSPITAL ENCOUNTER (EMERGENCY)
Facility: HOSPITAL | Age: 63
Discharge: HOME | End: 2023-12-21
Attending: EMERGENCY MEDICINE
Payer: COMMERCIAL

## 2023-12-21 ENCOUNTER — APPOINTMENT (EMERGENCY)
Dept: CT IMAGING | Facility: HOSPITAL | Age: 63
End: 2023-12-21
Payer: COMMERCIAL

## 2023-12-21 VITALS
DIASTOLIC BLOOD PRESSURE: 77 MMHG | OXYGEN SATURATION: 98 % | TEMPERATURE: 97.8 F | HEIGHT: 70 IN | SYSTOLIC BLOOD PRESSURE: 126 MMHG | RESPIRATION RATE: 18 BRPM | BODY MASS INDEX: 25.05 KG/M2 | WEIGHT: 175 LBS | HEART RATE: 69 BPM

## 2023-12-21 DIAGNOSIS — N20.1 URETEROLITHIASIS: Primary | ICD-10-CM

## 2023-12-21 LAB
ALBUMIN SERPL BCP-MCNC: 4.2 G/DL (ref 3.5–5)
ALP SERPL-CCNC: 64 U/L (ref 34–104)
ALT SERPL W P-5'-P-CCNC: 33 U/L (ref 7–52)
ANION GAP SERPL CALCULATED.3IONS-SCNC: 11 MMOL/L
AST SERPL W P-5'-P-CCNC: 21 U/L (ref 13–39)
BASOPHILS # BLD AUTO: 0.08 THOUSANDS/ÂΜL (ref 0–0.1)
BASOPHILS NFR BLD AUTO: 1 % (ref 0–1)
BILIRUB SERPL-MCNC: 0.75 MG/DL (ref 0.2–1)
BUN SERPL-MCNC: 14 MG/DL (ref 5–25)
CALCIUM SERPL-MCNC: 9.5 MG/DL (ref 8.4–10.2)
CHLORIDE SERPL-SCNC: 106 MMOL/L (ref 96–108)
CO2 SERPL-SCNC: 21 MMOL/L (ref 21–32)
CREAT SERPL-MCNC: 1.07 MG/DL (ref 0.6–1.3)
EOSINOPHIL # BLD AUTO: 0.02 THOUSAND/ÂΜL (ref 0–0.61)
EOSINOPHIL NFR BLD AUTO: 0 % (ref 0–6)
ERYTHROCYTE [DISTWIDTH] IN BLOOD BY AUTOMATED COUNT: 11.8 % (ref 11.6–15.1)
GFR SERPL CREATININE-BSD FRML MDRD: 74 ML/MIN/1.73SQ M
GLUCOSE SERPL-MCNC: 125 MG/DL (ref 65–140)
HCT VFR BLD AUTO: 43.3 % (ref 36.5–49.3)
HGB BLD-MCNC: 15.1 G/DL (ref 12–17)
IMM GRANULOCYTES # BLD AUTO: 0.04 THOUSAND/UL (ref 0–0.2)
IMM GRANULOCYTES NFR BLD AUTO: 0 % (ref 0–2)
LIPASE SERPL-CCNC: 20 U/L (ref 11–82)
LYMPHOCYTES # BLD AUTO: 1.99 THOUSANDS/ÂΜL (ref 0.6–4.47)
LYMPHOCYTES NFR BLD AUTO: 16 % (ref 14–44)
MCH RBC QN AUTO: 32.1 PG (ref 26.8–34.3)
MCHC RBC AUTO-ENTMCNC: 34.9 G/DL (ref 31.4–37.4)
MCV RBC AUTO: 92 FL (ref 82–98)
MONOCYTES # BLD AUTO: 0.76 THOUSAND/ÂΜL (ref 0.17–1.22)
MONOCYTES NFR BLD AUTO: 6 % (ref 4–12)
NEUTROPHILS # BLD AUTO: 9.93 THOUSANDS/ÂΜL (ref 1.85–7.62)
NEUTS SEG NFR BLD AUTO: 77 % (ref 43–75)
NRBC BLD AUTO-RTO: 0 /100 WBCS
PLATELET # BLD AUTO: 325 THOUSANDS/UL (ref 149–390)
PMV BLD AUTO: 8.4 FL (ref 8.9–12.7)
POTASSIUM SERPL-SCNC: 3.7 MMOL/L (ref 3.5–5.3)
PROT SERPL-MCNC: 7.5 G/DL (ref 6.4–8.4)
RBC # BLD AUTO: 4.71 MILLION/UL (ref 3.88–5.62)
SODIUM SERPL-SCNC: 138 MMOL/L (ref 135–147)
WBC # BLD AUTO: 12.82 THOUSAND/UL (ref 4.31–10.16)

## 2023-12-21 PROCEDURE — 80053 COMPREHEN METABOLIC PANEL: CPT | Performed by: EMERGENCY MEDICINE

## 2023-12-21 PROCEDURE — 83690 ASSAY OF LIPASE: CPT | Performed by: EMERGENCY MEDICINE

## 2023-12-21 PROCEDURE — 99285 EMERGENCY DEPT VISIT HI MDM: CPT | Performed by: EMERGENCY MEDICINE

## 2023-12-21 PROCEDURE — 96361 HYDRATE IV INFUSION ADD-ON: CPT

## 2023-12-21 PROCEDURE — 96375 TX/PRO/DX INJ NEW DRUG ADDON: CPT

## 2023-12-21 PROCEDURE — 85025 COMPLETE CBC W/AUTO DIFF WBC: CPT | Performed by: EMERGENCY MEDICINE

## 2023-12-21 PROCEDURE — 96374 THER/PROPH/DIAG INJ IV PUSH: CPT

## 2023-12-21 PROCEDURE — 99284 EMERGENCY DEPT VISIT MOD MDM: CPT

## 2023-12-21 PROCEDURE — 74176 CT ABD & PELVIS W/O CONTRAST: CPT

## 2023-12-21 PROCEDURE — 36415 COLL VENOUS BLD VENIPUNCTURE: CPT | Performed by: EMERGENCY MEDICINE

## 2023-12-21 RX ORDER — TAMSULOSIN HYDROCHLORIDE 0.4 MG/1
0.4 CAPSULE ORAL ONCE
Status: COMPLETED | OUTPATIENT
Start: 2023-12-21 | End: 2023-12-21

## 2023-12-21 RX ORDER — ONDANSETRON 2 MG/ML
4 INJECTION INTRAMUSCULAR; INTRAVENOUS ONCE
Status: COMPLETED | OUTPATIENT
Start: 2023-12-21 | End: 2023-12-21

## 2023-12-21 RX ORDER — HYDROMORPHONE HCL IN WATER/PF 6 MG/30 ML
0.2 PATIENT CONTROLLED ANALGESIA SYRINGE INTRAVENOUS ONCE
Status: COMPLETED | OUTPATIENT
Start: 2023-12-21 | End: 2023-12-21

## 2023-12-21 RX ORDER — TAMSULOSIN HYDROCHLORIDE 0.4 MG/1
0.4 CAPSULE ORAL
Qty: 30 CAPSULE | Refills: 0 | Status: SHIPPED | OUTPATIENT
Start: 2023-12-21

## 2023-12-21 RX ORDER — NAPROXEN 500 MG/1
500 TABLET ORAL 2 TIMES DAILY WITH MEALS
Qty: 30 TABLET | Refills: 0 | Status: SHIPPED | OUTPATIENT
Start: 2023-12-21

## 2023-12-21 RX ORDER — KETOROLAC TROMETHAMINE 30 MG/ML
15 INJECTION, SOLUTION INTRAMUSCULAR; INTRAVENOUS ONCE
Status: COMPLETED | OUTPATIENT
Start: 2023-12-21 | End: 2023-12-21

## 2023-12-21 RX ADMIN — ONDANSETRON 4 MG: 2 INJECTION INTRAMUSCULAR; INTRAVENOUS at 16:19

## 2023-12-21 RX ADMIN — TAMSULOSIN HYDROCHLORIDE 0.4 MG: 0.4 CAPSULE ORAL at 17:15

## 2023-12-21 RX ADMIN — KETOROLAC TROMETHAMINE 15 MG: 30 INJECTION, SOLUTION INTRAMUSCULAR at 16:21

## 2023-12-21 RX ADMIN — SODIUM CHLORIDE 1000 ML: 0.9 INJECTION, SOLUTION INTRAVENOUS at 16:23

## 2023-12-21 RX ADMIN — HYDROMORPHONE HYDROCHLORIDE 0.2 MG: 0.2 INJECTION, SOLUTION INTRAMUSCULAR; INTRAVENOUS; SUBCUTANEOUS at 16:21

## 2023-12-22 NOTE — ED PROVIDER NOTES
History  Chief Complaint   Patient presents with    Abdominal Pain     Patient states he has had on and off bouts of hematuria for a little over a month. Was scheduled for an ultrasound tonight because he just recently mentioned this to his doctor, but developed severe right sided abdominal pain     Patient reports sudden onset of severe right flank pain.  Reports that it is similar to previous ureterolithiasis but has had.  He was told he had more stones in his kidneys and is awaiting them to send.  States he does not have a urologist.  Pain is intermittent and sharp.  Rates pain as severe.  Declines pain medication.  No sick contacts.  No trauma to the area.  Bowel movements has been normal recently.  Hematuria has not been present for the past 2 weeks.        Prior to Admission Medications   Prescriptions Last Dose Informant Patient Reported? Taking?   ezetimibe (ZETIA) 10 mg tablet   No No   Sig: Take 1 tablet (10 mg total) by mouth daily      Facility-Administered Medications: None       Past Medical History:   Diagnosis Date    Arthritis     Herniated lumbar intervertebral disc     Hyperlipidemia     Lumbar radiculopathy        Past Surgical History:   Procedure Laterality Date    HERNIA REPAIR      KNEE SURGERY      LUMBAR LAMINECTOMY N/A 10/23/2018    Procedure: MIS/[facility] left L5-S1 hemiLAMINECTOMY, medial facetectmoy, & microdiscectomy;  Surgeon: Dar Alcantara MD;  Location: [facility];  Service: Neurosurgery       Family History   Problem Relation Age of Onset    Pancreatic cancer Mother      I have reviewed and agree with the history as documented.    E-Cigarette/Vaping    E-Cigarette Use Never User      E-Cigarette/Vaping Substances    Nicotine No     THC No     CBD No     Flavoring No     Other No     Unknown No      Social History     Tobacco Use    Smoking status: Former     Current packs/day: 0.00     Average packs/day: 0.5 packs/day for 40.0 years (20.0 ttl pk-yrs)     Types: Cigarettes     Start  date: 1977     Quit date: 2017     Years since quittin.0    Smokeless tobacco: Never   Vaping Use    Vaping status: Never Used   Substance Use Topics    Alcohol use: No    Drug use: Yes     Types: Marijuana       Review of Systems   Constitutional:  Negative for chills and fever.   Eyes:  Negative for visual disturbance.   Respiratory:  Negative for shortness of breath.    Cardiovascular:  Negative for chest pain.   Gastrointestinal:  Negative for abdominal distention.   Endocrine: Negative for polyuria.   Genitourinary:  Negative for decreased urine volume and dysuria.   Neurological:  Negative for dizziness, syncope and weakness.       Physical Exam  Physical Exam  Constitutional:       General: He is not in acute distress.     Appearance: He is well-developed. He is not ill-appearing, toxic-appearing or diaphoretic.   HENT:      Head: Normocephalic and atraumatic.   Eyes:      Conjunctiva/sclera: Conjunctivae normal.      Pupils: Pupils are equal, round, and reactive to light.   Cardiovascular:      Rate and Rhythm: Normal rate and regular rhythm.      Heart sounds: Normal heart sounds.   Pulmonary:      Effort: Pulmonary effort is normal. No respiratory distress.      Breath sounds: Normal breath sounds.   Abdominal:      General: Bowel sounds are normal.      Palpations: Abdomen is soft.   Musculoskeletal:         General: Normal range of motion.      Cervical back: Normal range of motion and neck supple.   Skin:     General: Skin is warm and dry.   Neurological:      Mental Status: He is alert and oriented to person, place, and time.   Psychiatric:         Behavior: Behavior normal.         Thought Content: Thought content normal.         Judgment: Judgment normal.         Vital Signs  ED Triage Vitals [23 1550]   Temperature Pulse Respirations Blood Pressure SpO2   97.8 °F (36.6 °C) 81 20 156/98 98 %      Temp Source Heart Rate Source Patient Position - Orthostatic VS BP Location FiO2 (%)    Temporal Monitor Lying Left arm --      Pain Score       7           Vitals:    12/21/23 1550 12/21/23 1715   BP: 156/98 126/77   Pulse: 81 69   Patient Position - Orthostatic VS: Lying Lying         Visual Acuity      ED Medications  Medications   ondansetron (ZOFRAN) injection 4 mg (4 mg Intravenous Given 12/21/23 1619)   sodium chloride 0.9 % bolus 1,000 mL (0 mL Intravenous Stopped 12/21/23 1720)   ketorolac (TORADOL) injection 15 mg (15 mg Intravenous Given 12/21/23 1621)   HYDROmorphone HCl (DILAUDID) injection 0.2 mg (0.2 mg Intravenous Given 12/21/23 1621)   tamsulosin (FLOMAX) capsule 0.4 mg (0.4 mg Oral Given 12/21/23 1715)       Diagnostic Studies  Results Reviewed       Procedure Component Value Units Date/Time    Lipase [[id number]]  (Normal) Collected: 12/21/23 1602    Lab Status: Final result Specimen: Blood from Arm, Right Updated: 12/21/23 1636     Lipase 20 u/L     CMP [[id number]] Collected: 12/21/23 1602    Lab Status: Final result Specimen: Blood from Arm, Right Updated: 12/21/23 1636     Sodium 138 mmol/L      Potassium 3.7 mmol/L      Chloride 106 mmol/L      CO2 21 mmol/L      ANION GAP 11 mmol/L      BUN 14 mg/dL      Creatinine 1.07 mg/dL      Glucose 125 mg/dL      Calcium 9.5 mg/dL      AST 21 U/L      ALT 33 U/L      Alkaline Phosphatase 64 U/L      Total Protein 7.5 g/dL      Albumin 4.2 g/dL      Total Bilirubin 0.75 mg/dL      eGFR 74 ml/min/1.73sq m     Narrative:      National Kid[employer] Disease Foundation guidelines for Chronic Kidney Disease (CKD):     Stage 1 with normal or high GFR (GFR > 90 mL/min/1.73 square meters)    Stage 2 Mild CKD (GFR = 60-89 mL/min/1.73 square meters)    Stage 3A Moderate CKD (GFR = 45-59 mL/min/1.73 square meters)    Stage 3B Moderate CKD (GFR = 30-44 mL/min/1.73 square meters)    Stage 4 Severe CKD (GFR = 15-29 mL/min/1.73 square meters)    Stage 5 End Stage CKD (GFR <15 mL/min/1.73 square meters)  Note: GFR calculation is accurate only with a steady  state creatinine    CBC and differential [[id number]]  (Abnormal) Collected: 12/21/23 1602    Lab Status: Final result Specimen: Blood from Arm, Right Updated: 12/21/23 1611     WBC 12.82 Thousand/uL      RBC 4.71 Million/uL      Hemoglobin 15.1 g/dL      Hematocrit 43.3 %      MCV 92 fL      MCH 32.1 pg      MCHC 34.9 g/dL      RDW 11.8 %      MPV 8.4 fL      Platelets 325 Thousands/uL      nRBC 0 /100 WBCs      Neutrophils Relative 77 %      Immat GRANS % 0 %      Lymphocytes Relative 16 %      Monocytes Relative 6 %      Eosinophils Relative 0 %      Basophils Relative 1 %      Neutrophils Absolute 9.93 Thousands/µL      Immature Grans Absolute 0.04 Thousand/uL      Lymphocytes Absolute 1.99 Thousands/µL      Monocytes Absolute 0.76 Thousand/µL      Eosinophils Absolute 0.02 Thousand/µL      Basophils Absolute 0.08 Thousands/µL                    CT renal stone study abdomen pelvis wo contrast   Final Result by Rosibel Levi MD (12/21 1645)      A 7 mm right ureteral calculus at the right ureteropelvic junction with resultant mild right hydronephrosis.      Bilateral nephrolithiasis.      Workstation performed: [id number]                    Procedures  Procedures         ED Course                                             Medical Decision Making  Patient presented with flank pain and CT consistent with renal stone.  Patient's pain well controlled at this time.  Clinically well-appearing and completely nontoxic.  Patient is afebrile.  No significant leukocytosis.  Urinalysis not consistent with UTI.  No severe hydronephrosis or OLIVER present.  Will refer patient to urology with strict return precautions to the ED. All of their questions were answered and they were agreeable to plan.     Problems Addressed:  Ureterolithiasis: acute illness or injury    Amount and/or Complexity of Data Reviewed  Labs: ordered.  Radiology: ordered.  Discussion of management or test interpretation with external provider(s): Case  discussed with Dr. Starr of urology.  He will attempt to see patient in his office within the next week.    Risk  Prescription drug management.             Disposition  Final diagnoses:   Ureterolithiasis     Time reflects when diagnosis was documented in both MDM as applicable and the Disposition within this note       Time User Action Codes Description Comment    12/21/2023  5:02 PM Alex Jones Add [N20.1] Ureterolithiasis           ED Disposition       ED Disposition   Discharge    Condition   Stable    Date/Time   u Dec 21, 2023  5:02 PM    Comment   Ulises Soares discharge to home/self care.                   Follow-up Information       Follow up With Specialties Details Why Contact Info    [facility], Nurse Practitioner In 1 day  [address] [place] [id number]  [phone]      Juaquin Starr MD Urology In 1 day  [address] [facility] [id number] [phone]              Discharge Medication List as of 12/21/2023  5:04 PM        START taking these medications    Details   naproxen (NAPROSYN) 500 mg tablet Take 1 tablet (500 mg total) by mouth 2 (two) times a day with meals, Starting Thu 12/21/2023, Normal      tamsulosin (FLOMAX) 0.4 mg Take 1 capsule (0.4 mg total) by mouth daily with dinner, Starting Thu 12/21/2023, Normal           CONTINUE these medications which have NOT CHANGED    Details   ezetimibe (ZETIA) 10 mg tablet Take 1 tablet (10 mg total) by mouth daily, Starting Tue 6/13/2023, Normal                 PDMP Review         Value Time User    PDMP Reviewed  Yes 12/28/2020  3:33 PM Williams Vann PA-C            ED Provider  Electronically Signed by             Alex Jones MD  12/22/23 5152

## 2024-01-18 ENCOUNTER — HOSPITAL ENCOUNTER (OUTPATIENT)
Dept: RADIOLOGY | Facility: HOSPITAL | Age: 64
End: 2024-01-18
Payer: COMMERCIAL

## 2024-01-18 ENCOUNTER — HOSPITAL ENCOUNTER (OUTPATIENT)
Dept: ULTRASOUND IMAGING | Facility: HOSPITAL | Age: 64
End: 2024-01-18
Attending: UROLOGY
Payer: COMMERCIAL

## 2024-01-18 DIAGNOSIS — N20.0 CALCULUS OF KIDNEY: ICD-10-CM

## 2024-01-18 DIAGNOSIS — N20.1 CALCULUS OF URETER: ICD-10-CM

## 2024-01-18 DIAGNOSIS — N20.0 URIC ACID NEPHROLITHIASIS: ICD-10-CM

## 2024-01-18 PROCEDURE — 74018 RADEX ABDOMEN 1 VIEW: CPT

## 2024-01-18 PROCEDURE — 76775 US EXAM ABDO BACK WALL LIM: CPT

## 2024-02-13 ENCOUNTER — HOSPITAL ENCOUNTER (OUTPATIENT)
Dept: RADIOLOGY | Facility: HOSPITAL | Age: 64
Discharge: HOME | End: 2024-02-13
Payer: COMMERCIAL

## 2024-02-13 DIAGNOSIS — N20.1 CALCULUS OF URETER: ICD-10-CM

## 2024-02-13 PROCEDURE — 74018 RADEX ABDOMEN 1 VIEW: CPT

## 2024-03-26 NOTE — TELEPHONE ENCOUNTER
Patient contacted.     Patient states that ABX did help somewhat, but starla has UTI symptoms.    OV scheduled with Dr. Johnston tomorrow afternoon.    Ana Paula Trinidad, RN, BSN  [facility]     Spoke with patient in regards to xray report

## 2024-05-02 NOTE — DISCHARGE SUMMARY
Discharge Summary - [facility]    Patient Information: Yesenia Reyes 66 y o  male MRN: [id number]  Unit/Bed#: [facility] 923-01 Encounter: [id number]    Discharging Physician / Practitioner: Irma Rashid DO  PCP: Jyoti Gonzalez MD  Admission Date: 10/20/2018  Discharge Date: 10/24/18    Disposition:     Home    Reason for Admission:   Lumbar radiculopathy status post L5-S1 hemilaminectomy and microdiskectomy    Discharge Diagnoses:     Principal Problem:    Lumbar radiculopathy  Active Problems:    Weakness of left foot    Herniated lumbar intervertebral disc  Resolved Problems:    * No resolved hospital problems  *      Consultations During Hospital Stay:  · Neurosurgery    Procedures Performed:     Lumbar spine MRI  Multilevel degenerative disc disease and facet arthropathy  There is a focal left paracentral disc protrusionposterior to the L5 vertebral body  It cannot be determined on theseimages whether this originates at the L4-5 or L5-S1 level given the motion  artifact  This does result in displacement of the descending left-sided  nerve roots  The motion artifact particularly limits assessment of the  neural foramen  Neural foraminal narrowing is seen bilaterally at L4-5  and L5-S1      Left L5-S1 hemilaminectomy, medial facetectmoy, & microdiscectomy  ·     Significant Findings / Test Results:     · As above    Incidental Findings:   · As above    Test Results Pending at Discharge (will require follow up):   none     Outpatient Tests Requested:  · Outpatient PT    Complications:  none    Hospital Course:     Yesenia Reyes is a 66 y o  male patient who originally presented to the hospital on 10/20/2018 due to progressive left foot weakness and paresthesias, patient with chronic low back pain and lumbar disc disease  Lumbar spine MRI with above findings, neurosurgery consulted regarding left foot drop, patient underwent left L5-S1 hemilaminectomy and microdiskectomy on 10/23 without Rx request received  Pharmacy populated  Last appmt 3/30/23 for physical. Overdue for physical. Left message for patient to call office back and schedule physical       complications  Currently patient is in stable condition,  left foot drop is slightly improving, back pain is improving, physical therapy recommending outpatient PT  Pain is under control  Patient was cleared for by Neurosurgery to discharge home to follow with outpatient Neurosurgery in 2 weeks for an incision check    Patient instructed to follow with his family doctor in 1 week    Condition at Discharge: stable     Discharge Day Visit / Exam:     Subjective:    patient seen and examined  Comfortable in bed  Left foot drop is improving  Back pain is improving  Vitals: Blood Pressure: 138/87 (10/24/18 1051)  Pulse: 101 (10/24/18 1051)  Temperature: 98 3 °F (36 8 °C) (10/24/18 1051)  Temp Source: Oral (10/24/18 1051)  Respirations: 18 (10/24/18 1051)  Height: 5' 10" (177 8 cm) (10/20/18 2037)  Weight - Scale: 73 7 kg (162 lb 7 7 oz) (10/24/18 0530)  SpO2: 98 % (10/24/18 1051)  Exam:   Physical Exam  Patient is awake alert oriented in no acute distress  Lung clear to auscultation bilateral  Heart positive S1-S2 no murmur  Abdomen soft nontender positive bowel sounds  Lower extremities no edema  Discussion with Family:  Unable to reach wife by phone    Discharge instructions/Information to patient and family:   See after visit summary for information provided to patient and family  Provisions for Follow-Up Care:  See after visit summary for information related to follow-up care and any pertinent home health orders  Planned Readmission: no     Discharge Statement:  I spent 42 minutes discharging the patient  This time was spent on the day of discharge  I had direct contact with the patient on the day of discharge  Greater than 50% of the total time was spent examining patient, answering all patient questions, arranging and discussing plan of care with patient as well as directly providing post-discharge instructions  Additional time then spent on discharge activities      Discharge Medications:  See after visit summary for reconciled discharge medications provided to patient and family        ** Please Note: This note has been constructed using a voice recognition system **

## 2024-06-18 ENCOUNTER — APPOINTMENT (OUTPATIENT)
Dept: LAB | Facility: CLINIC | Age: 64
End: 2024-06-18
Payer: COMMERCIAL

## 2024-06-18 DIAGNOSIS — E78.00 HYPERCHOLESTEROLEMIA: ICD-10-CM

## 2024-06-18 DIAGNOSIS — Z00.00 ANNUAL PHYSICAL EXAM: ICD-10-CM

## 2024-06-18 DIAGNOSIS — R31.9 HEMATURIA, UNSPECIFIED TYPE: ICD-10-CM

## 2024-06-18 DIAGNOSIS — Z12.5 SCREENING PSA (PROSTATE SPECIFIC ANTIGEN): ICD-10-CM

## 2024-06-18 DIAGNOSIS — R73.03 PREDIABETES: ICD-10-CM

## 2024-06-18 LAB
ALBUMIN SERPL BCP-MCNC: 4.2 G/DL (ref 3.5–5)
ALP SERPL-CCNC: 60 U/L (ref 34–104)
ALT SERPL W P-5'-P-CCNC: 17 U/L (ref 7–52)
ANION GAP SERPL CALCULATED.3IONS-SCNC: 12 MMOL/L (ref 4–13)
AST SERPL W P-5'-P-CCNC: 16 U/L (ref 13–39)
BILIRUB SERPL-MCNC: 0.47 MG/DL (ref 0.2–1)
BUN SERPL-MCNC: 16 MG/DL (ref 5–25)
CALCIUM SERPL-MCNC: 9 MG/DL (ref 8.4–10.2)
CHLORIDE SERPL-SCNC: 106 MMOL/L (ref 96–108)
CHOLEST SERPL-MCNC: 196 MG/DL
CO2 SERPL-SCNC: 21 MMOL/L (ref 21–32)
CREAT SERPL-MCNC: 0.88 MG/DL (ref 0.6–1.3)
ERYTHROCYTE [DISTWIDTH] IN BLOOD BY AUTOMATED COUNT: 12.3 % (ref 11.6–15.1)
EST. AVERAGE GLUCOSE BLD GHB EST-MCNC: 131 MG/DL
GFR SERPL CREATININE-BSD FRML MDRD: 92 ML/MIN/1.73SQ M
GLUCOSE P FAST SERPL-MCNC: 122 MG/DL (ref 65–99)
HBA1C MFR BLD: 6.2 %
HCT VFR BLD AUTO: 44.4 % (ref 36.5–49.3)
HDLC SERPL-MCNC: 47 MG/DL
HGB BLD-MCNC: 15.2 G/DL (ref 12–17)
LDLC SERPL CALC-MCNC: 128 MG/DL (ref 0–100)
MCH RBC QN AUTO: 31.7 PG (ref 26.8–34.3)
MCHC RBC AUTO-ENTMCNC: 34.2 G/DL (ref 31.4–37.4)
MCV RBC AUTO: 93 FL (ref 82–98)
NONHDLC SERPL-MCNC: 149 MG/DL
PLATELET # BLD AUTO: 387 THOUSANDS/UL (ref 149–390)
PMV BLD AUTO: 8.8 FL (ref 8.9–12.7)
POTASSIUM SERPL-SCNC: 4 MMOL/L (ref 3.5–5.3)
PROT SERPL-MCNC: 7.7 G/DL (ref 6.4–8.4)
PSA SERPL-MCNC: 0.96 NG/ML (ref 0–4)
RBC # BLD AUTO: 4.79 MILLION/UL (ref 3.88–5.62)
SODIUM SERPL-SCNC: 139 MMOL/L (ref 135–147)
TRIGL SERPL-MCNC: 104 MG/DL
WBC # BLD AUTO: 7.39 THOUSAND/UL (ref 4.31–10.16)

## 2024-06-18 PROCEDURE — 36415 COLL VENOUS BLD VENIPUNCTURE: CPT

## 2024-06-18 PROCEDURE — 80061 LIPID PANEL: CPT

## 2024-06-18 PROCEDURE — 80053 COMPREHEN METABOLIC PANEL: CPT

## 2024-06-18 PROCEDURE — 85027 COMPLETE CBC AUTOMATED: CPT

## 2024-06-18 PROCEDURE — 84153 ASSAY OF PSA TOTAL: CPT

## 2024-06-18 PROCEDURE — 83036 HEMOGLOBIN GLYCOSYLATED A1C: CPT

## 2024-06-28 ENCOUNTER — OFFICE VISIT (OUTPATIENT)
Dept: FAMILY MEDICINE CLINIC | Facility: CLINIC | Age: 64
End: 2024-06-28
Payer: COMMERCIAL

## 2024-06-28 VITALS
OXYGEN SATURATION: 97 % | TEMPERATURE: 98.2 F | DIASTOLIC BLOOD PRESSURE: 70 MMHG | HEIGHT: 70 IN | BODY MASS INDEX: 24.91 KG/M2 | HEART RATE: 87 BPM | WEIGHT: 174 LBS | SYSTOLIC BLOOD PRESSURE: 120 MMHG

## 2024-06-28 DIAGNOSIS — E78.00 HYPERCHOLESTEROLEMIA: ICD-10-CM

## 2024-06-28 DIAGNOSIS — R73.03 PRE-DIABETES: Primary | ICD-10-CM

## 2024-06-28 DIAGNOSIS — Z87.891 HISTORY OF SMOKING 10-25 PACK YEARS: ICD-10-CM

## 2024-06-28 PROCEDURE — 99214 OFFICE O/P EST MOD 30 MIN: CPT | Performed by: NURSE PRACTITIONER

## 2024-06-28 RX ORDER — EZETIMIBE 10 MG/1
10 TABLET ORAL DAILY
Qty: 90 TABLET | Refills: 3 | Status: SHIPPED | OUTPATIENT
Start: 2024-06-28

## 2024-06-28 NOTE — PROGRESS NOTES
Ambulatory Visit  Name: Ulises Soares      : 1960      MRN: [id number]  Encounter Provider: [facility]  Encounter Date: 2024   Encounter department: [facility]    Assessment & Plan   1. Pre-diabetes  -     Comprehensive metabolic panel; Future  -     Hemoglobin A1C; Future  2. Hypercholesterolemia  -     ezetimibe (ZETIA) 10 mg tablet; Take 1 tablet (10 mg total) by mouth daily  -     Lipid panel; Future  3. History of smoking 10-25 pack years  -     Urinalysis with microscopic; Future      Depression Screening and Follow-up Plan: Patient was screened for depression during today's encounter. They screened negative with a PHQ-2 score of 0.        History of Present Illness     Patient here for routine follow up visit.  Reviewed labs.     Prediabetes. HgbA1c increased up to 6.2.  has been trying to watch his diet but sugars continue to increase. Eating rye bread, sometimes white. Sugars have sylvia slowly going up over the last year.       Review of Systems   Constitutional: Negative.  Negative for fatigue and fever.   Respiratory: Negative.  Negative for shortness of breath and wheezing.    Cardiovascular: Negative.  Negative for chest pain and palpitations.   Musculoskeletal: Negative.    Skin: Negative.  Negative for rash.   Neurological: Negative.  Negative for dizziness, light-headedness and headaches.   Psychiatric/Behavioral: Negative.  Negative for decreased concentration. The patient is not nervous/anxious.      Past Medical History:   Diagnosis Date    Arthritis     Herniated lumbar intervertebral disc     Hyperlipidemia     Lumbar radiculopathy      Past Surgical History:   Procedure Laterality Date    HERNIA REPAIR      KNEE SURGERY      LUMBAR LAMINECTOMY N/A 10/23/2018    Procedure: MIS/[facility] left L5-S1 hemiLAMINECTOMY, medial facetectmoy, & microdiscectomy;  Surgeon: Dar Alcantara MD;  Location: [facility];  Service: Neurosurgery     Family History  "  Problem Relation Age of Onset    Pancreatic cancer Mother      Social History     Tobacco Use    Smoking status: Former     Current packs/day: 0.00     Average packs/day: 0.5 packs/day for 40.0 years (20.0 ttl pk-yrs)     Types: Cigarettes     Start date: 1977     Quit date: 2017     Years since quittin.5    Smokeless tobacco: Never   Vaping Use    Vaping status: Never Used   Substance and Sexual Activity    Alcohol use: No    Drug use: Yes     Types: Marijuana    Sexual activity: Yes     Partners: Female     Current Outpatient Medications on File Prior to Visit   Medication Sig    [DISCONTINUED] ezetimibe (ZETIA) 10 mg tablet Take 1 tablet (10 mg total) by mouth daily    [DISCONTINUED] naproxen (NAPROSYN) 500 mg tablet Take 1 tablet (500 mg total) by mouth 2 (two) times a day with meals    [DISCONTINUED] tamsulosin (FLOMAX) 0.4 mg Take 1 capsule (0.4 mg total) by mouth daily with dinner     Allergies   Allergen Reactions    Lipitor [Atorvastatin]      Leg Cramping.      Immunization History   Administered Date(s) Administered    INFLUENZA 2022    Influenza, injectable, quadrivalent, preservative free 0.5 mL 2023    Influenza, recombinant, quadrivalent,injectable, preservative free 10/24/2018, 10/30/2019, 2020, 10/19/2022     Objective     /70   Pulse 87   Temp 98.2 °F (36.8 °C)   Ht 5' 10\" (1.778 m)   Wt 78.9 kg (174 lb)   SpO2 97%   BMI 24.97 kg/m²     Physical Exam  Vitals and nursing note reviewed.   Constitutional:       General: He is not in acute distress.     Appearance: He is well-developed. He is not diaphoretic.   HENT:      Head: Normocephalic and atraumatic.      Mouth/Throat:      Mouth: Mucous membranes are moist.   Cardiovascular:      Rate and Rhythm: Normal rate and regular rhythm.      Heart sounds: Normal heart sounds.   Pulmonary:      Effort: Pulmonary effort is normal. No respiratory distress.      Breath sounds: Normal breath sounds. "   Musculoskeletal:         General: Normal range of motion.   Skin:     General: Skin is warm and dry.      Capillary Refill: Capillary refill takes less than 2 seconds.      Findings: No erythema or rash.   Neurological:      General: No focal deficit present.      Mental Status: He is alert and oriented to person, place, and time.   Psychiatric:         Behavior: Behavior normal. Behavior is cooperative.         Thought Content: Thought content normal.       Administrative Statements

## 2024-12-16 ENCOUNTER — APPOINTMENT (OUTPATIENT)
Dept: LAB | Facility: CLINIC | Age: 64
End: 2024-12-16
Payer: COMMERCIAL

## 2024-12-16 DIAGNOSIS — E78.00 HYPERCHOLESTEROLEMIA: ICD-10-CM

## 2024-12-16 DIAGNOSIS — Z87.891 HISTORY OF SMOKING 10-25 PACK YEARS: ICD-10-CM

## 2024-12-16 DIAGNOSIS — R73.03 PRE-DIABETES: ICD-10-CM

## 2024-12-16 LAB
ALBUMIN SERPL BCG-MCNC: 4.1 G/DL (ref 3.5–5)
ALP SERPL-CCNC: 64 U/L (ref 34–104)
ALT SERPL W P-5'-P-CCNC: 18 U/L (ref 7–52)
ANION GAP SERPL CALCULATED.3IONS-SCNC: 9 MMOL/L (ref 4–13)
AST SERPL W P-5'-P-CCNC: 17 U/L (ref 13–39)
BILIRUB SERPL-MCNC: 0.57 MG/DL (ref 0.2–1)
BUN SERPL-MCNC: 14 MG/DL (ref 5–25)
CALCIUM SERPL-MCNC: 9.2 MG/DL (ref 8.4–10.2)
CHLORIDE SERPL-SCNC: 106 MMOL/L (ref 96–108)
CHOLEST SERPL-MCNC: 193 MG/DL (ref ?–200)
CO2 SERPL-SCNC: 24 MMOL/L (ref 21–32)
CREAT SERPL-MCNC: 0.78 MG/DL (ref 0.6–1.3)
EST. AVERAGE GLUCOSE BLD GHB EST-MCNC: 126 MG/DL
GFR SERPL CREATININE-BSD FRML MDRD: 96 ML/MIN/1.73SQ M
GLUCOSE P FAST SERPL-MCNC: 109 MG/DL (ref 65–99)
HBA1C MFR BLD: 6 %
HDLC SERPL-MCNC: 39 MG/DL
LDLC SERPL CALC-MCNC: 135 MG/DL (ref 0–100)
NONHDLC SERPL-MCNC: 154 MG/DL
POTASSIUM SERPL-SCNC: 4.1 MMOL/L (ref 3.5–5.3)
PROT SERPL-MCNC: 7.4 G/DL (ref 6.4–8.4)
SODIUM SERPL-SCNC: 139 MMOL/L (ref 135–147)
TRIGL SERPL-MCNC: 93 MG/DL (ref ?–150)

## 2024-12-16 PROCEDURE — 80061 LIPID PANEL: CPT

## 2024-12-16 PROCEDURE — 36415 COLL VENOUS BLD VENIPUNCTURE: CPT

## 2024-12-16 PROCEDURE — 80053 COMPREHEN METABOLIC PANEL: CPT

## 2024-12-16 PROCEDURE — 83036 HEMOGLOBIN GLYCOSYLATED A1C: CPT

## 2024-12-18 ENCOUNTER — OFFICE VISIT (OUTPATIENT)
Dept: FAMILY MEDICINE CLINIC | Facility: CLINIC | Age: 64
End: 2024-12-18
Payer: COMMERCIAL

## 2024-12-18 VITALS
DIASTOLIC BLOOD PRESSURE: 80 MMHG | HEART RATE: 88 BPM | SYSTOLIC BLOOD PRESSURE: 118 MMHG | HEIGHT: 70 IN | BODY MASS INDEX: 25.62 KG/M2 | TEMPERATURE: 97.8 F | WEIGHT: 179 LBS | OXYGEN SATURATION: 98 %

## 2024-12-18 DIAGNOSIS — Z78.9 STATIN INTOLERANCE: ICD-10-CM

## 2024-12-18 DIAGNOSIS — Z00.00 ANNUAL PHYSICAL EXAM: Primary | ICD-10-CM

## 2024-12-18 DIAGNOSIS — R73.03 PRE-DIABETES: ICD-10-CM

## 2024-12-18 DIAGNOSIS — E78.00 HYPERCHOLESTEROLEMIA: ICD-10-CM

## 2024-12-18 DIAGNOSIS — Z12.5 SCREENING PSA (PROSTATE SPECIFIC ANTIGEN): ICD-10-CM

## 2024-12-18 DIAGNOSIS — Z87.891 HISTORY OF SMOKING 10-25 PACK YEARS: ICD-10-CM

## 2024-12-18 PROCEDURE — 99396 PREV VISIT EST AGE 40-64: CPT | Performed by: NURSE PRACTITIONER

## 2024-12-18 NOTE — PATIENT INSTRUCTIONS
"Patient Education     Routine physical for adults   The Basics   Written by the doctors and editors at [facility]   What is a physical? -- A physical is a routine visit, or \"check-up,\" with your doctor. You might also hear it called a \"wellness visit\" or \"preventive visit.\"  During each visit, the doctor will:   Ask about your physical and mental health   Ask about your habits, behaviors, and lifestyle   Do an exam   Give you vaccines if needed   Talk to you about any medicines you take   Give advice about your health   Answer your questions  Getting regular check-ups is an important part of taking care of your health. It can help your doctor find and treat any problems you have. But it's also important for preventing health problems.  A routine physical is different from a \"sick visit.\" A sick visit is when you see a doctor because of a health concern or problem. Since physicals are scheduled ahead of time, you can think about what you want to ask the doctor.  How often should I get a physical? -- It depends on your age and health. In general, for people age 21 years and older:   If you are younger than 50 years, you might be able to get a physical every 3 years.   If you are 50 years or older, your doctor might recommend a physical every year.  If you have an ongoing health condition, like diabetes or high blood pressure, your doctor will probably want to see you more often.  What happens during a physical? -- In general, each visit will include:   Physical exam - The doctor or nurse will check your height, weight, heart rate, and blood pressure. They will also look at your eyes and ears. They will ask about how you are feeling and whether you have any symptoms that bother you.   Medicines - It's a good idea to bring a list of all the medicines you take to each doctor visit. Your doctor will talk to you about your medicines and answer any questions. Tell them if you are having any side effects that bother you. You " "should also tell them if you are having trouble paying for any of your medicines.   Habits and behaviors - This includes:   Your diet   Your exercise habits   Whether you smoke, drink alcohol, or use drugs   Whether you are sexually active   Whether you feel safe at home  Your doctor will talk to you about things you can do to improve your health and lower your risk of health problems. They will also offer help and support. For example, if you want to quit smoking, they can give you advice and might prescribe medicines. If you want to improve your diet or get more physical activity, they can help you with this, too.   Lab tests, if needed - The tests you get will depend on your age and situation. For example, your doctor might want to check your:   Cholesterol   Blood sugar   Iron level   Vaccines - The recommended vaccines will depend on your age, health, and what vaccines you already had. Vaccines are very important because they can prevent certain serious or deadly infections.   Discussion of screening - \"Screening\" means checking for diseases or other health problems before they cause symptoms. Your doctor can recommend screening based on your age, risk, and preferences. This might include tests to check for:   Cancer, such as breast, prostate, cervical, ovarian, colorectal, prostate, lung, or skin cancer   Sexually transmitted infections, such as chlamydia and gonorrhea   Mental health conditions like depression and anxiety  Your doctor will talk to you about the different types of screening tests. They can help you decide which screenings to have. They can also explain what the results might mean.   Answering questions - The physical is a good time to ask the doctor or nurse questions about your health. If needed, they can refer you to other doctors or specialists, too.  Adults older than 65 years often need other care, too. As you get older, your doctor will talk to you about:   How to prevent falling at " home   Hearing or vision tests   Memory testing   How to take your medicines safely   Making sure that you have the help and support you need at home  All topics are updated as new evidence becomes available and our peer review process is complete.  This topic retrieved from [employer] on: May 02, 2024.  Topic 829026 Version 1.0  Release: 32.4.3 - C32.122  © 2024 UpToDate, Inc. and/or its affiliates. All rights reserved.  Consumer Information Use and Disclaimer   Disclaimer: This generalized information is a limited summary of diagnosis, treatment, and/or medication information. It is not meant to be comprehensive and should be used as a tool to help the user understand and/or assess potential diagnostic and treatment options. It does NOT include all information about conditions, treatments, medications, side effects, or risks that may apply to a specific patient. It is not intended to be medical advice or a substitute for the medical advice, diagnosis, or treatment of a health care provider based on the health care provider's examination and assessment of a patient's specific and unique circumstances. Patients must speak with a health care provider for complete information about their health, medical questions, and treatment options, including any risks or benefits regarding use of medications. This information does not endorse any treatments or medications as safe, effective, or approved for treating a specific patient. UpToDate, Inc. and its affiliates disclaim any warranty or liability relating to this information or the use thereof.The use of this information is governed by the Terms of Use, available at https://www.woltersSurgiLightuwer.com/en/know/clinical-effectiveness-terms. 2024© UpToDate, Inc. and its affiliates and/or licensors. All rights reserved.  Copyright   © 2024 UpToDate, Inc. and/or its affiliates. All rights reserved.

## 2024-12-18 NOTE — PROGRESS NOTES
Adult Annual Physical  Name: Ulises Soares      : 1960      MRN: [id number]  Encounter Provider: DEVYN Mesa  Encounter Date: 2024   Encounter department: [facility] PRIMARY [facility]ARE    Assessment & Plan  Annual physical exam    Orders:  •  Hemoglobin A1C; Future  •  Comprehensive metabolic panel; Future  •  Lipid panel; Future  •  CBC and differential; Future  •  PSA, Total Screen; Future    Hypercholesterolemia    Orders:  •  Lipid panel; Future    Pre-diabetes    Orders:  •  Hemoglobin A1C; Future    Screening PSA (prostate specific antigen)    Orders:  •  PSA, Total Screen; Future    History of smoking 10-25 pack years    Orders:  •  CBC and differential; Future    Statin intolerance         Immunizations and preventive care screenings were discussed with patient today. Appropriate education was printed on patient's after visit summary.    Counseling:  Alcohol/drug use: discussed moderation in alcohol intake, the recommendations for healthy alcohol use, and avoidance of illicit drug use.  Dental Health: discussed importance of regular tooth brushing, flossing, and dental visits.  Sexual health: discussed sexually transmitted diseases, partner selection, use of condoms, avoidance of unintended pregnancy, and contraceptive alternatives.  Exercise: the importance of regular exercise/physical activity was discussed. Recommend exercise 3-5 times per week for at least 30 minutes.       Depression Screening and Follow-up Plan: Patient was screened for depression during today's encounter. They screened negative with a PHQ-2 score of 0.        History of Present Illness     Adult Annual Physical:  Patient presents for annual physical.     Diet and Physical Activity:  - Diet/Nutrition: low carb diet, well balanced diet and consuming 3-5 servings of fruits/vegetables daily.  - Exercise: no formal exercise.    Depression Screening:  - PHQ-2 Score: 0    General Health:  - Sleep: 7-8 hours  "of sleep on average and sleeps well.  - Hearing: decreased hearing bilateral ears.  - Vision: most recent eye exam < 1 year ago. reading glasses.  - Dental: no dental visits for > 1 year and brushes teeth twice daily.     Health:    - Urinary symptoms: none.     Review of Systems   Constitutional: Negative.  Negative for activity change, appetite change, chills, fatigue and fever.   HENT:  Negative for congestion, ear pain, nosebleeds, rhinorrhea and sore throat.    Eyes:  Negative for photophobia, pain, redness and visual disturbance.   Respiratory:  Negative for cough, shortness of breath and wheezing.    Cardiovascular: Negative.  Negative for chest pain.   Gastrointestinal: Negative.  Negative for abdominal pain, constipation, diarrhea and vomiting.   Endocrine: Negative.    Genitourinary:  Negative for difficulty urinating, dysuria and flank pain.   Musculoskeletal: Negative.    Skin:  Negative for color change and rash.   Neurological:  Negative for dizziness, weakness, numbness and headaches.   Hematological:  Negative for adenopathy.   Psychiatric/Behavioral:  Negative for agitation and confusion. The patient is not nervous/anxious.      Medical History Reviewed by provider this encounter:     .    Objective   /80   Pulse 88   Temp 97.8 °F (36.6 °C)   Ht 5' 10\" (1.778 m)   Wt 81.2 kg (179 lb)   SpO2 98%   BMI 25.68 kg/m²     Physical Exam  Vitals and nursing note reviewed.   Constitutional:       General: He is not in acute distress.     Appearance: He is well-developed. He is not diaphoretic.   Cardiovascular:      Rate and Rhythm: Normal rate and regular rhythm.      Heart sounds: Normal heart sounds. No murmur heard.  Pulmonary:      Effort: Pulmonary effort is normal. No respiratory distress.      Breath sounds: Normal breath sounds. No wheezing or rales.   Abdominal:      Hernia: No hernia is present.   Musculoskeletal:         General: Normal range of motion.   Skin:     General: Skin is " warm and dry.      Capillary Refill: Capillary refill takes less than 2 seconds.      Findings: No erythema or rash.   Neurological:      Mental Status: He is alert and oriented to person, place, and time.   Psychiatric:         Behavior: Behavior normal. Behavior is cooperative.         Thought Content: Thought content normal.

## 2025-06-23 ENCOUNTER — APPOINTMENT (OUTPATIENT)
Dept: LAB | Facility: CLINIC | Age: 65
End: 2025-06-23
Attending: NURSE PRACTITIONER
Payer: COMMERCIAL

## 2025-06-23 DIAGNOSIS — Z00.00 ANNUAL PHYSICAL EXAM: ICD-10-CM

## 2025-06-23 DIAGNOSIS — R73.03 PRE-DIABETES: ICD-10-CM

## 2025-06-23 DIAGNOSIS — Z12.5 SCREENING PSA (PROSTATE SPECIFIC ANTIGEN): ICD-10-CM

## 2025-06-23 DIAGNOSIS — E78.00 HYPERCHOLESTEROLEMIA: ICD-10-CM

## 2025-06-23 DIAGNOSIS — Z87.891 HISTORY OF SMOKING 10-25 PACK YEARS: ICD-10-CM

## 2025-06-23 LAB
ALBUMIN SERPL BCG-MCNC: 4 G/DL (ref 3.5–5)
ALP SERPL-CCNC: 69 U/L (ref 34–104)
ALT SERPL W P-5'-P-CCNC: 24 U/L (ref 7–52)
ANION GAP SERPL CALCULATED.3IONS-SCNC: 11 MMOL/L (ref 4–13)
AST SERPL W P-5'-P-CCNC: 21 U/L (ref 13–39)
BASOPHILS # BLD AUTO: 0.08 THOUSANDS/ÂΜL (ref 0–0.1)
BASOPHILS NFR BLD AUTO: 1 % (ref 0–1)
BILIRUB SERPL-MCNC: 0.58 MG/DL (ref 0.2–1)
BUN SERPL-MCNC: 11 MG/DL (ref 5–25)
CALCIUM SERPL-MCNC: 8.7 MG/DL (ref 8.4–10.2)
CHLORIDE SERPL-SCNC: 108 MMOL/L (ref 96–108)
CHOLEST SERPL-MCNC: 193 MG/DL (ref ?–200)
CO2 SERPL-SCNC: 21 MMOL/L (ref 21–32)
CREAT SERPL-MCNC: 0.87 MG/DL (ref 0.6–1.3)
EOSINOPHIL # BLD AUTO: 0.29 THOUSAND/ÂΜL (ref 0–0.61)
EOSINOPHIL NFR BLD AUTO: 4 % (ref 0–6)
ERYTHROCYTE [DISTWIDTH] IN BLOOD BY AUTOMATED COUNT: 12.2 % (ref 11.6–15.1)
EST. AVERAGE GLUCOSE BLD GHB EST-MCNC: 120 MG/DL
GFR SERPL CREATININE-BSD FRML MDRD: 92 ML/MIN/1.73SQ M
GLUCOSE P FAST SERPL-MCNC: 116 MG/DL (ref 65–99)
HBA1C MFR BLD: 5.8 %
HCT VFR BLD AUTO: 43.9 % (ref 36.5–49.3)
HDLC SERPL-MCNC: 42 MG/DL
HGB BLD-MCNC: 15 G/DL (ref 12–17)
IMM GRANULOCYTES # BLD AUTO: 0.02 THOUSAND/UL (ref 0–0.2)
IMM GRANULOCYTES NFR BLD AUTO: 0 % (ref 0–2)
LDLC SERPL CALC-MCNC: 126 MG/DL (ref 0–100)
LYMPHOCYTES # BLD AUTO: 2.43 THOUSANDS/ÂΜL (ref 0.6–4.47)
LYMPHOCYTES NFR BLD AUTO: 37 % (ref 14–44)
MCH RBC QN AUTO: 31.9 PG (ref 26.8–34.3)
MCHC RBC AUTO-ENTMCNC: 34.2 G/DL (ref 31.4–37.4)
MCV RBC AUTO: 93 FL (ref 82–98)
MONOCYTES # BLD AUTO: 0.62 THOUSAND/ÂΜL (ref 0.17–1.22)
MONOCYTES NFR BLD AUTO: 9 % (ref 4–12)
NEUTROPHILS # BLD AUTO: 3.15 THOUSANDS/ÂΜL (ref 1.85–7.62)
NEUTS SEG NFR BLD AUTO: 49 % (ref 43–75)
NONHDLC SERPL-MCNC: 151 MG/DL
NRBC BLD AUTO-RTO: 0 /100 WBCS
PLATELET # BLD AUTO: 375 THOUSANDS/UL (ref 149–390)
PMV BLD AUTO: 8.8 FL (ref 8.9–12.7)
POTASSIUM SERPL-SCNC: 3.8 MMOL/L (ref 3.5–5.3)
PROT SERPL-MCNC: 7.4 G/DL (ref 6.4–8.4)
PSA SERPL-MCNC: 1 NG/ML (ref 0–4)
RBC # BLD AUTO: 4.7 MILLION/UL (ref 3.88–5.62)
SODIUM SERPL-SCNC: 140 MMOL/L (ref 135–147)
TRIGL SERPL-MCNC: 127 MG/DL (ref ?–150)
WBC # BLD AUTO: 6.59 THOUSAND/UL (ref 4.31–10.16)

## 2025-06-23 PROCEDURE — 80053 COMPREHEN METABOLIC PANEL: CPT

## 2025-06-23 PROCEDURE — 80061 LIPID PANEL: CPT

## 2025-06-23 PROCEDURE — 83036 HEMOGLOBIN GLYCOSYLATED A1C: CPT

## 2025-06-23 PROCEDURE — G0103 PSA SCREENING: HCPCS

## 2025-06-23 PROCEDURE — 36415 COLL VENOUS BLD VENIPUNCTURE: CPT

## 2025-06-23 PROCEDURE — 85025 COMPLETE CBC W/AUTO DIFF WBC: CPT

## 2025-06-25 ENCOUNTER — OFFICE VISIT (OUTPATIENT)
Dept: FAMILY MEDICINE CLINIC | Facility: CLINIC | Age: 65
End: 2025-06-25
Payer: COMMERCIAL

## 2025-06-25 VITALS
RESPIRATION RATE: 18 BRPM | HEART RATE: 80 BPM | BODY MASS INDEX: 25.48 KG/M2 | SYSTOLIC BLOOD PRESSURE: 110 MMHG | OXYGEN SATURATION: 97 % | TEMPERATURE: 97.7 F | DIASTOLIC BLOOD PRESSURE: 80 MMHG | WEIGHT: 178 LBS | HEIGHT: 70 IN

## 2025-06-25 DIAGNOSIS — Z78.9 STATIN INTOLERANCE: ICD-10-CM

## 2025-06-25 DIAGNOSIS — R73.03 PRE-DIABETES: ICD-10-CM

## 2025-06-25 DIAGNOSIS — E78.00 HYPERCHOLESTEROLEMIA: Primary | ICD-10-CM

## 2025-06-25 DIAGNOSIS — Z53.20 COLON CANCER SCREENING DECLINED: ICD-10-CM

## 2025-06-25 DIAGNOSIS — Z87.891 HISTORY OF SMOKING 10-25 PACK YEARS: ICD-10-CM

## 2025-06-25 PROCEDURE — 99214 OFFICE O/P EST MOD 30 MIN: CPT | Performed by: NURSE PRACTITIONER

## 2025-06-25 RX ORDER — EZETIMIBE 10 MG/1
10 TABLET ORAL DAILY
Qty: 90 TABLET | Refills: 3 | Status: SHIPPED | OUTPATIENT
Start: 2025-06-25

## 2025-06-25 NOTE — PROGRESS NOTES
"Name: Ulises Soares      : 1960      MRN: [id number]  Encounter Provider: DEVYN Mesa  Encounter Date: 2025   Encounter department: [facility]  :  Assessment & Plan  Hypercholesterolemia  Statin intolerance,  complaint with zetia. Cholesterol stable. .   Orders:  •  ezetimibe (ZETIA) 10 mg tablet; Take 1 tablet (10 mg total) by mouth daily  •  Comprehensive metabolic panel; Future  •  Lipid panel; Future    Pre-diabetes  HgbA1c 5.8.   Orders:  •  Hemoglobin A1C; Future    Statin intolerance         Colon cancer screening declined         History of smoking 10-25 pack years    Orders:  •  CBC and differential; Future           History of Present Illness   Patient here for routine follow up visit.  Prediabetes- HgbA1c improved to 5.8.  Cholesterol stable on Zetia. Taking supplement for cholesterol. Quit smoking about 7-8 years ago.  Declines lung cancer screening.       Review of Systems   Constitutional: Negative.  Negative for fatigue and fever.   Respiratory:  Negative for shortness of breath and wheezing.    Cardiovascular: Negative.  Negative for chest pain and palpitations.   Gastrointestinal:  Negative for abdominal pain.   Musculoskeletal:  Positive for arthralgias. Negative for myalgias.   Skin: Negative.  Negative for rash.   Neurological: Negative.  Negative for dizziness, light-headedness and numbness.   Psychiatric/Behavioral: Negative.  Negative for decreased concentration.        Objective   /80   Pulse 80   Temp 97.7 °F (36.5 °C)   Resp 18   Ht 5' 10\" (1.778 m)   Wt 80.7 kg (178 lb)   SpO2 97%   BMI 25.54 kg/m²      Physical Exam  Vitals and nursing note reviewed.   Constitutional:       General: He is not in acute distress.     Appearance: He is well-developed. He is not diaphoretic.     Cardiovascular:      Rate and Rhythm: Normal rate and regular rhythm.      Heart sounds: Normal heart sounds. No murmur heard.  Pulmonary:      " Effort: Pulmonary effort is normal. No respiratory distress.      Breath sounds: Normal breath sounds. No wheezing.   Abdominal:      General: Bowel sounds are normal.     Musculoskeletal:         General: Normal range of motion.     Skin:     General: Skin is warm and dry.      Capillary Refill: Capillary refill takes less than 2 seconds.      Findings: No erythema or rash.     Neurological:      General: No focal deficit present.      Mental Status: He is alert and oriented to person, place, and time.     Psychiatric:         Behavior: Behavior normal. Behavior is cooperative.         Thought Content: Thought content normal.

## 2025-06-25 NOTE — ASSESSMENT & PLAN NOTE
Statin intolerance,  complaint with zetia. Cholesterol stable. .   Orders:  •  ezetimibe (ZETIA) 10 mg tablet; Take 1 tablet (10 mg total) by mouth daily  •  Comprehensive metabolic panel; Future  •  Lipid panel; Future

## (undated) DEVICE — INTENDED FOR TISSUE SEPARATION, AND OTHER PROCEDURES THAT REQUIRE A SHARP SURGICAL BLADE TO PUNCTURE OR CUT.: Brand: BARD-PARKER ® CARBON RIB-BACK BLADES

## (undated) DEVICE — SURGIFOAM 8.5 X 12.5

## (undated) DEVICE — MINOR PROCEDURE DRAPE: Brand: CONVERTORS

## (undated) DEVICE — ANTIBACTERIAL VIOLET BRAIDED (POLYGLACTIN 910), SYNTHETIC ABSORBABLE SUTURE: Brand: COATED VICRYL

## (undated) DEVICE — INTENDED FOR TISSUE SEPARATION, AND OTHER PROCEDURES THAT REQUIRE A SHARP SURGICAL BLADE TO PUNCTURE OR CUT.: Brand: BARD-PARKER SAFETY BLADES SIZE 15, STERILE

## (undated) DEVICE — TOOL T12MH25 LEGEND 12CM 2.5MM MH: Brand: MIDAS REX ™

## (undated) DEVICE — PENCIL ELECTROSURG E-Z CLEAN -0035H

## (undated) DEVICE — DRAPE MICROSCOPE OPMI PENTERO

## (undated) DEVICE — SUT MONOCRYL PLUS 3-0 PS-2 27 IN MCP427H

## (undated) DEVICE — SPONGE PVP SCRUB WING STERILE

## (undated) DEVICE — GLOVE SRG BIOGEL ECLIPSE 8

## (undated) DEVICE — BETHLEHEM UNIVERSAL SPINE, KIT: Brand: CARDINAL HEALTH

## (undated) DEVICE — SYRINGE 3ML LL

## (undated) DEVICE — DISPOSABLE EQUIPMENT COVER: Brand: SMALL TOWEL DRAPE

## (undated) DEVICE — SNAP KOVER: Brand: UNBRANDED

## (undated) DEVICE — DRESSING MEPILEX AG BORDER 4 X 4 IN

## (undated) DEVICE — NEEDLE 25G X 1 1/2

## (undated) DEVICE — DRAPE EQUIPMENT RF WAND

## (undated) DEVICE — NEEDLE 18 G X 1 1/2

## (undated) DEVICE — PREP SURGICAL PURPREP 26ML

## (undated) DEVICE — GLOVE INDICATOR PI UNDERGLOVE SZ 8 BLUE

## (undated) DEVICE — ELECTRODE BLADE MOD  E-Z CLEAN 6.5IN -0014M